# Patient Record
Sex: MALE | Race: ASIAN | NOT HISPANIC OR LATINO | URBAN - METROPOLITAN AREA
[De-identification: names, ages, dates, MRNs, and addresses within clinical notes are randomized per-mention and may not be internally consistent; named-entity substitution may affect disease eponyms.]

---

## 2018-08-16 ENCOUNTER — INPATIENT HOSPITAL (OUTPATIENT)
Dept: URBAN - METROPOLITAN AREA HOSPITAL 107 | Facility: HOSPITAL | Age: 43
End: 2018-08-16
Payer: COMMERCIAL

## 2018-08-16 DIAGNOSIS — R11.2 NAUSEA WITH VOMITING, UNSPECIFIED: ICD-10-CM

## 2018-08-16 DIAGNOSIS — K70.9 ALCOHOLIC LIVER DISEASE, UNSPECIFIED: ICD-10-CM

## 2018-08-16 DIAGNOSIS — F10.10 ALCOHOL ABUSE, UNCOMPLICATED: ICD-10-CM

## 2018-08-16 DIAGNOSIS — R94.5 ABNORMAL RESULTS OF LIVER FUNCTION STUDIES: ICD-10-CM

## 2018-08-16 DIAGNOSIS — R44.3 HALLUCINATIONS, UNSPECIFIED: ICD-10-CM

## 2018-08-16 DIAGNOSIS — R93.3 ABNORMAL FINDINGS ON DIAGNOSTIC IMAGING OF OTHER PARTS OF DI: ICD-10-CM

## 2018-08-16 PROCEDURE — 99223 1ST HOSP IP/OBS HIGH 75: CPT

## 2018-08-28 ENCOUNTER — OFFICE (OUTPATIENT)
Dept: URBAN - METROPOLITAN AREA CLINIC 75 | Facility: CLINIC | Age: 43
End: 2018-08-28
Payer: COMMERCIAL

## 2018-08-28 VITALS
DIASTOLIC BLOOD PRESSURE: 64 MMHG | SYSTOLIC BLOOD PRESSURE: 112 MMHG | HEIGHT: 66 IN | WEIGHT: 154 LBS | HEART RATE: 62 BPM

## 2018-08-28 DIAGNOSIS — R94.5 ABNORMAL RESULTS OF LIVER FUNCTION STUDIES: ICD-10-CM

## 2018-08-28 DIAGNOSIS — F10.10 ALCOHOL ABUSE, UNCOMPLICATED: ICD-10-CM

## 2018-08-28 DIAGNOSIS — R93.2 ABNORMAL FINDINGS ON DIAGNOSTIC IMAGING OF LIVER AND BILIARY: ICD-10-CM

## 2018-08-28 PROCEDURE — 99203 OFFICE O/P NEW LOW 30 MIN: CPT | Performed by: INTERNAL MEDICINE

## 2018-10-19 ENCOUNTER — INPATIENT HOSPITAL (OUTPATIENT)
Dept: URBAN - METROPOLITAN AREA HOSPITAL 107 | Facility: HOSPITAL | Age: 43
End: 2018-10-19
Payer: COMMERCIAL

## 2018-10-19 DIAGNOSIS — F10.10 ALCOHOL ABUSE, UNCOMPLICATED: ICD-10-CM

## 2018-10-19 DIAGNOSIS — R10.9 UNSPECIFIED ABDOMINAL PAIN: ICD-10-CM

## 2018-10-19 DIAGNOSIS — R17 UNSPECIFIED JAUNDICE: ICD-10-CM

## 2018-10-19 DIAGNOSIS — K70.9 ALCOHOLIC LIVER DISEASE, UNSPECIFIED: ICD-10-CM

## 2018-10-19 DIAGNOSIS — R11.2 NAUSEA WITH VOMITING, UNSPECIFIED: ICD-10-CM

## 2018-10-19 DIAGNOSIS — R18.8 OTHER ASCITES: ICD-10-CM

## 2018-10-19 PROCEDURE — 99223 1ST HOSP IP/OBS HIGH 75: CPT | Performed by: INTERNAL MEDICINE

## 2018-10-20 ENCOUNTER — INPATIENT HOSPITAL (OUTPATIENT)
Dept: URBAN - METROPOLITAN AREA HOSPITAL 107 | Facility: HOSPITAL | Age: 43
End: 2018-10-20
Payer: COMMERCIAL

## 2018-10-20 DIAGNOSIS — K70.9 ALCOHOLIC LIVER DISEASE, UNSPECIFIED: ICD-10-CM

## 2018-10-20 DIAGNOSIS — R11.2 NAUSEA WITH VOMITING, UNSPECIFIED: ICD-10-CM

## 2018-10-20 DIAGNOSIS — R17 UNSPECIFIED JAUNDICE: ICD-10-CM

## 2018-10-20 DIAGNOSIS — R18.8 OTHER ASCITES: ICD-10-CM

## 2018-10-20 DIAGNOSIS — F10.10 ALCOHOL ABUSE, UNCOMPLICATED: ICD-10-CM

## 2018-10-20 DIAGNOSIS — R10.9 UNSPECIFIED ABDOMINAL PAIN: ICD-10-CM

## 2018-10-20 PROCEDURE — 99232 SBSQ HOSP IP/OBS MODERATE 35: CPT | Performed by: INTERNAL MEDICINE

## 2018-10-21 PROCEDURE — 99232 SBSQ HOSP IP/OBS MODERATE 35: CPT | Performed by: INTERNAL MEDICINE

## 2018-10-22 ENCOUNTER — INPATIENT HOSPITAL (OUTPATIENT)
Dept: URBAN - METROPOLITAN AREA HOSPITAL 107 | Facility: HOSPITAL | Age: 43
End: 2018-10-22
Payer: COMMERCIAL

## 2018-10-22 DIAGNOSIS — K70.9 ALCOHOLIC LIVER DISEASE, UNSPECIFIED: ICD-10-CM

## 2018-10-22 DIAGNOSIS — F10.10 ALCOHOL ABUSE, UNCOMPLICATED: ICD-10-CM

## 2018-10-22 DIAGNOSIS — R11.2 NAUSEA WITH VOMITING, UNSPECIFIED: ICD-10-CM

## 2018-10-22 DIAGNOSIS — R18.8 OTHER ASCITES: ICD-10-CM

## 2018-10-22 DIAGNOSIS — R17 UNSPECIFIED JAUNDICE: ICD-10-CM

## 2018-10-22 DIAGNOSIS — R10.12 LEFT UPPER QUADRANT PAIN: ICD-10-CM

## 2018-10-22 DIAGNOSIS — R10.11 RIGHT UPPER QUADRANT PAIN: ICD-10-CM

## 2018-10-22 PROCEDURE — 99232 SBSQ HOSP IP/OBS MODERATE 35: CPT | Performed by: PHYSICIAN ASSISTANT

## 2018-10-23 PROCEDURE — 99231 SBSQ HOSP IP/OBS SF/LOW 25: CPT | Performed by: PHYSICIAN ASSISTANT

## 2018-10-24 ENCOUNTER — INPATIENT HOSPITAL (OUTPATIENT)
Dept: URBAN - METROPOLITAN AREA HOSPITAL 107 | Facility: HOSPITAL | Age: 43
End: 2018-10-24
Payer: COMMERCIAL

## 2018-10-24 DIAGNOSIS — R10.9 UNSPECIFIED ABDOMINAL PAIN: ICD-10-CM

## 2018-10-24 DIAGNOSIS — K25.9 GASTRIC ULCER, UNSPECIFIED AS ACUTE OR CHRONIC, WITHOUT HEMO: ICD-10-CM

## 2018-10-24 DIAGNOSIS — K29.70 GASTRITIS, UNSPECIFIED, WITHOUT BLEEDING: ICD-10-CM

## 2018-10-24 PROCEDURE — 43239 EGD BIOPSY SINGLE/MULTIPLE: CPT | Performed by: INTERNAL MEDICINE

## 2018-10-30 ENCOUNTER — OFFICE (OUTPATIENT)
Dept: URBAN - METROPOLITAN AREA CLINIC 75 | Facility: CLINIC | Age: 43
End: 2018-10-30
Payer: COMMERCIAL

## 2018-10-30 VITALS
DIASTOLIC BLOOD PRESSURE: 76 MMHG | HEIGHT: 66 IN | SYSTOLIC BLOOD PRESSURE: 124 MMHG | WEIGHT: 146 LBS | HEART RATE: 92 BPM

## 2018-10-30 DIAGNOSIS — F10.10 ALCOHOL ABUSE, UNCOMPLICATED: ICD-10-CM

## 2018-10-30 DIAGNOSIS — K70.9 ALCOHOLIC LIVER DISEASE, UNSPECIFIED: ICD-10-CM

## 2018-10-30 DIAGNOSIS — K29.70 GASTRITIS, UNSPECIFIED, WITHOUT BLEEDING: ICD-10-CM

## 2018-10-30 PROCEDURE — 99213 OFFICE O/P EST LOW 20 MIN: CPT | Performed by: INTERNAL MEDICINE

## 2018-12-18 ENCOUNTER — INPATIENT HOSPITAL (OUTPATIENT)
Dept: URBAN - METROPOLITAN AREA HOSPITAL 107 | Facility: HOSPITAL | Age: 43
End: 2018-12-18
Payer: COMMERCIAL

## 2018-12-18 DIAGNOSIS — F10.239 ALCOHOL DEPENDENCE WITH WITHDRAWAL, UNSPECIFIED: ICD-10-CM

## 2018-12-18 DIAGNOSIS — K70.30 ALCOHOLIC CIRRHOSIS OF LIVER WITHOUT ASCITES: ICD-10-CM

## 2018-12-18 DIAGNOSIS — F10.10 ALCOHOL ABUSE, UNCOMPLICATED: ICD-10-CM

## 2018-12-18 PROCEDURE — 99222 1ST HOSP IP/OBS MODERATE 55: CPT | Performed by: INTERNAL MEDICINE

## 2018-12-31 ENCOUNTER — APPOINTMENT (OUTPATIENT)
Dept: GENERAL RADIOLOGY | Facility: HOSPITAL | Age: 43
End: 2018-12-31

## 2018-12-31 ENCOUNTER — HOSPITAL ENCOUNTER (EMERGENCY)
Facility: HOSPITAL | Age: 43
Discharge: HOME | End: 2018-12-31
Attending: EMERGENCY MEDICINE

## 2018-12-31 ENCOUNTER — APPOINTMENT (OUTPATIENT)
Dept: CT IMAGING | Facility: HOSPITAL | Age: 43
End: 2018-12-31

## 2018-12-31 VITALS
WEIGHT: 148.7 LBS | TEMPERATURE: 98.3 F | OXYGEN SATURATION: 98 % | HEIGHT: 66 IN | RESPIRATION RATE: 18 BRPM | DIASTOLIC BLOOD PRESSURE: 73 MMHG | HEART RATE: 82 BPM | BODY MASS INDEX: 23.9 KG/M2 | SYSTOLIC BLOOD PRESSURE: 110 MMHG

## 2018-12-31 DIAGNOSIS — D53.9 MACROCYTIC ANEMIA: ICD-10-CM

## 2018-12-31 DIAGNOSIS — R17 JAUNDICE: Primary | ICD-10-CM

## 2018-12-31 DIAGNOSIS — K70.31 ALCOHOLIC CIRRHOSIS OF LIVER WITH ASCITES (HCC): ICD-10-CM

## 2018-12-31 DIAGNOSIS — E87.1 HYPONATREMIA: ICD-10-CM

## 2018-12-31 DIAGNOSIS — F10.20 ALCOHOLISM (HCC): ICD-10-CM

## 2018-12-31 LAB
ALBUMIN SERPL-MCNC: 2.4 G/DL (ref 3.5–5.2)
ALBUMIN/GLOB SERPL: 0.5 G/DL
ALP SERPL-CCNC: 108 U/L (ref 39–117)
ALT SERPL W P-5'-P-CCNC: 26 U/L (ref 1–41)
AMMONIA BLD-SCNC: 39 UMOL/L (ref 16–60)
AMPHET+METHAMPHET UR QL: NEGATIVE
ANION GAP SERPL CALCULATED.3IONS-SCNC: 7.1 MMOL/L
AST SERPL-CCNC: 76 U/L (ref 1–40)
BARBITURATES UR QL SCN: NEGATIVE
BASOPHILS # BLD AUTO: 0.1 10*3/MM3 (ref 0–0.2)
BASOPHILS NFR BLD AUTO: 2 % (ref 0–1.5)
BENZODIAZ UR QL SCN: POSITIVE
BILIRUB CONJ SERPL-MCNC: 3.2 MG/DL (ref 0–0.3)
BILIRUB SERPL-MCNC: 5 MG/DL (ref 0.1–1.2)
BILIRUB UR QL STRIP: ABNORMAL
BUN BLD-MCNC: 6 MG/DL (ref 6–20)
BUN/CREAT SERPL: 5.6 (ref 7–25)
CALCIUM SPEC-SCNC: 8.3 MG/DL (ref 8.6–10.5)
CANNABINOIDS SERPL QL: POSITIVE
CHLORIDE SERPL-SCNC: 102 MMOL/L (ref 98–107)
CK SERPL-CCNC: 65 U/L (ref 20–200)
CLARITY UR: CLEAR
CO2 SERPL-SCNC: 23.9 MMOL/L (ref 22–29)
COCAINE UR QL: NEGATIVE
COLOR UR: ABNORMAL
CREAT BLD-MCNC: 1.08 MG/DL (ref 0.76–1.27)
DEPRECATED RDW RBC AUTO: 74.9 FL (ref 37–54)
EOSINOPHIL # BLD AUTO: 0.15 10*3/MM3 (ref 0–0.7)
EOSINOPHIL NFR BLD AUTO: 3 % (ref 0.3–6.2)
ERYTHROCYTE [DISTWIDTH] IN BLOOD BY AUTOMATED COUNT: 19.5 % (ref 11.5–14.5)
ETHANOL BLD-MCNC: <10 MG/DL (ref 0–10)
ETHANOL UR QL: <0.01 %
GFR SERPL CREATININE-BSD FRML MDRD: 74 ML/MIN/1.73
GFR SERPL CREATININE-BSD FRML MDRD: 90 ML/MIN/1.73
GLOBULIN UR ELPH-MCNC: 5 GM/DL
GLUCOSE BLD-MCNC: 104 MG/DL (ref 65–99)
GLUCOSE UR STRIP-MCNC: NEGATIVE MG/DL
HAV IGM SERPL QL IA: NORMAL
HBV CORE IGM SERPL QL IA: NORMAL
HBV SURFACE AG SERPL QL IA: NORMAL
HCT VFR BLD AUTO: 28 % (ref 40.4–52.2)
HCV AB SER DONR QL: NORMAL
HGB BLD-MCNC: 9 G/DL (ref 13.7–17.6)
HGB UR QL STRIP.AUTO: NEGATIVE
HOLD SPECIMEN: NORMAL
HOLD SPECIMEN: NORMAL
IMM GRANULOCYTES # BLD AUTO: 0 10*3/MM3 (ref 0–0.03)
IMM GRANULOCYTES NFR BLD AUTO: 0 % (ref 0–0.5)
INR PPP: 2.09 (ref 0.9–1.1)
KETONES UR QL STRIP: ABNORMAL
LEUKOCYTE ESTERASE UR QL STRIP.AUTO: NEGATIVE
LIPASE SERPL-CCNC: 71 U/L (ref 13–60)
LYMPHOCYTES # BLD AUTO: 1.13 10*3/MM3 (ref 0.9–4.8)
LYMPHOCYTES NFR BLD AUTO: 22.5 % (ref 19.6–45.3)
MAGNESIUM SERPL-MCNC: 1.7 MG/DL (ref 1.6–2.6)
MCH RBC QN AUTO: 33.7 PG (ref 27–32.7)
MCHC RBC AUTO-ENTMCNC: 32.1 G/DL (ref 32.6–36.4)
MCV RBC AUTO: 104.9 FL (ref 79.8–96.2)
METHADONE UR QL SCN: NEGATIVE
MONOCYTES # BLD AUTO: 0.39 10*3/MM3 (ref 0.2–1.2)
MONOCYTES NFR BLD AUTO: 7.8 % (ref 5–12)
NEUTROPHILS # BLD AUTO: 3.25 10*3/MM3 (ref 1.9–8.1)
NEUTROPHILS NFR BLD AUTO: 64.7 % (ref 42.7–76)
NITRITE UR QL STRIP: NEGATIVE
NRBC BLD AUTO-RTO: 0 /100 WBC (ref 0–0)
OPIATES UR QL: NEGATIVE
OXYCODONE UR QL SCN: NEGATIVE
PH UR STRIP.AUTO: 6.5 [PH] (ref 5–8)
PHOSPHATE SERPL-MCNC: 3.1 MG/DL (ref 2.5–4.5)
PLATELET # BLD AUTO: 73 10*3/MM3 (ref 140–500)
PMV BLD AUTO: 10.2 FL (ref 6–12)
POTASSIUM BLD-SCNC: 3.8 MMOL/L (ref 3.5–5.2)
PROT SERPL-MCNC: 7.4 G/DL (ref 6–8.5)
PROT UR QL STRIP: NEGATIVE
PROTHROMBIN TIME: 23.1 SECONDS (ref 11.7–14.2)
RBC # BLD AUTO: 2.67 10*6/MM3 (ref 4.6–6)
SODIUM BLD-SCNC: 133 MMOL/L (ref 136–145)
SP GR UR STRIP: 1.02 (ref 1–1.03)
T4 FREE SERPL-MCNC: 1.18 NG/DL (ref 0.93–1.7)
TSH SERPL DL<=0.05 MIU/L-ACNC: 1.71 MIU/ML (ref 0.27–4.2)
UROBILINOGEN UR QL STRIP: ABNORMAL
WBC NRBC COR # BLD: 5.02 10*3/MM3 (ref 4.5–10.7)
WHOLE BLOOD HOLD SPECIMEN: NORMAL
WHOLE BLOOD HOLD SPECIMEN: NORMAL

## 2018-12-31 PROCEDURE — 80307 DRUG TEST PRSMV CHEM ANLYZR: CPT | Performed by: EMERGENCY MEDICINE

## 2018-12-31 PROCEDURE — 82248 BILIRUBIN DIRECT: CPT | Performed by: EMERGENCY MEDICINE

## 2018-12-31 PROCEDURE — 71046 X-RAY EXAM CHEST 2 VIEWS: CPT

## 2018-12-31 PROCEDURE — 99284 EMERGENCY DEPT VISIT MOD MDM: CPT

## 2018-12-31 PROCEDURE — 80074 ACUTE HEPATITIS PANEL: CPT | Performed by: EMERGENCY MEDICINE

## 2018-12-31 PROCEDURE — 82550 ASSAY OF CK (CPK): CPT | Performed by: EMERGENCY MEDICINE

## 2018-12-31 PROCEDURE — 85025 COMPLETE CBC W/AUTO DIFF WBC: CPT | Performed by: EMERGENCY MEDICINE

## 2018-12-31 PROCEDURE — 96365 THER/PROPH/DIAG IV INF INIT: CPT

## 2018-12-31 PROCEDURE — 25010000002 THIAMINE PER 100 MG: Performed by: EMERGENCY MEDICINE

## 2018-12-31 PROCEDURE — 82140 ASSAY OF AMMONIA: CPT | Performed by: EMERGENCY MEDICINE

## 2018-12-31 PROCEDURE — 93005 ELECTROCARDIOGRAM TRACING: CPT | Performed by: EMERGENCY MEDICINE

## 2018-12-31 PROCEDURE — 96361 HYDRATE IV INFUSION ADD-ON: CPT

## 2018-12-31 PROCEDURE — 84100 ASSAY OF PHOSPHORUS: CPT | Performed by: EMERGENCY MEDICINE

## 2018-12-31 PROCEDURE — 83690 ASSAY OF LIPASE: CPT | Performed by: EMERGENCY MEDICINE

## 2018-12-31 PROCEDURE — 85610 PROTHROMBIN TIME: CPT | Performed by: EMERGENCY MEDICINE

## 2018-12-31 PROCEDURE — 25010000002 IOPAMIDOL 61 % SOLUTION: Performed by: EMERGENCY MEDICINE

## 2018-12-31 PROCEDURE — 80053 COMPREHEN METABOLIC PANEL: CPT | Performed by: EMERGENCY MEDICINE

## 2018-12-31 PROCEDURE — 93010 ELECTROCARDIOGRAM REPORT: CPT | Performed by: INTERNAL MEDICINE

## 2018-12-31 PROCEDURE — 74177 CT ABD & PELVIS W/CONTRAST: CPT

## 2018-12-31 PROCEDURE — 81003 URINALYSIS AUTO W/O SCOPE: CPT | Performed by: EMERGENCY MEDICINE

## 2018-12-31 PROCEDURE — 83735 ASSAY OF MAGNESIUM: CPT | Performed by: EMERGENCY MEDICINE

## 2018-12-31 PROCEDURE — 84443 ASSAY THYROID STIM HORMONE: CPT | Performed by: EMERGENCY MEDICINE

## 2018-12-31 PROCEDURE — 84439 ASSAY OF FREE THYROXINE: CPT | Performed by: EMERGENCY MEDICINE

## 2018-12-31 RX ORDER — ATENOLOL 25 MG/1
25 TABLET ORAL DAILY
Status: ON HOLD | COMMUNITY
End: 2020-01-02

## 2018-12-31 RX ORDER — THIAMINE MONONITRATE (VIT B1) 100 MG
100 TABLET ORAL DAILY
COMMUNITY

## 2018-12-31 RX ORDER — FUROSEMIDE 40 MG/1
40 TABLET ORAL 2 TIMES DAILY
COMMUNITY

## 2018-12-31 RX ORDER — FOLIC ACID 1 MG/1
1 TABLET ORAL DAILY
COMMUNITY

## 2018-12-31 RX ORDER — ONDANSETRON 4 MG/1
4 TABLET, FILM COATED ORAL EVERY 6 HOURS PRN
Status: ON HOLD | COMMUNITY
End: 2020-01-02

## 2018-12-31 RX ORDER — TEMAZEPAM 15 MG/1
15 CAPSULE ORAL NIGHTLY PRN
Status: ON HOLD | COMMUNITY
End: 2020-01-02

## 2018-12-31 RX ORDER — CHLORDIAZEPOXIDE HYDROCHLORIDE 5 MG/1
5 CAPSULE, GELATIN COATED ORAL 3 TIMES DAILY
Status: ON HOLD | COMMUNITY
End: 2020-01-02

## 2018-12-31 RX ORDER — SPIRONOLACTONE 50 MG/1
50 TABLET, FILM COATED ORAL DAILY
COMMUNITY

## 2018-12-31 RX ORDER — SODIUM CHLORIDE 0.9 % (FLUSH) 0.9 %
10 SYRINGE (ML) INJECTION AS NEEDED
Status: DISCONTINUED | OUTPATIENT
Start: 2018-12-31 | End: 2018-12-31 | Stop reason: HOSPADM

## 2018-12-31 RX ORDER — PANTOPRAZOLE SODIUM 40 MG/1
40 TABLET, DELAYED RELEASE ORAL DAILY
COMMUNITY

## 2018-12-31 RX ADMIN — SODIUM CHLORIDE, POTASSIUM CHLORIDE, SODIUM LACTATE AND CALCIUM CHLORIDE 1000 ML: 600; 310; 30; 20 INJECTION, SOLUTION INTRAVENOUS at 18:21

## 2018-12-31 RX ADMIN — THIAMINE HYDROCHLORIDE 100 MG: 100 INJECTION, SOLUTION INTRAMUSCULAR; INTRAVENOUS at 19:24

## 2018-12-31 RX ADMIN — IOPAMIDOL 85 ML: 612 INJECTION, SOLUTION INTRAVENOUS at 19:43

## 2019-01-01 NOTE — DISCHARGE INSTRUCTIONS
Alcohol Detox Assistance     [facility] [place]   [address] [place] [address] [facility]   Toll Free: [phone] Phone: [phone]      [facility] [place]   [address] [place] [address]      [facility] [address] [address] [facility]   Phone: [phone]    [facility]  [address] [place], [place] [address] [phone]    [facility]  [address] [place] [address]  Main Phone: [phone]  Assessment and Referral Center: [phone]    The [facility] [place]  is located at  [address] [place], [place] [address] [phone]    The [facility] [place]  is located at  [address] [id number] [phone]     The [address] [id number] [phone] (p)  [phone] (f: Exec. office)  [phone] (f: Front office)     [facility]  [address] [place], [place]  [phone]    [facility]  [phone]    National Suicide Prevention Lifeline  Call [phone]  Available 24 hours everyday

## 2019-02-05 ENCOUNTER — OFFICE (OUTPATIENT)
Dept: URBAN - METROPOLITAN AREA CLINIC 75 | Facility: CLINIC | Age: 44
End: 2019-02-05
Payer: COMMERCIAL

## 2019-02-05 VITALS
OXYGEN SATURATION: 97 % | WEIGHT: 157 LBS | RESPIRATION RATE: 16 BRPM | HEIGHT: 66 IN | SYSTOLIC BLOOD PRESSURE: 124 MMHG | DIASTOLIC BLOOD PRESSURE: 82 MMHG | HEART RATE: 90 BPM

## 2019-02-05 DIAGNOSIS — F10.10 ALCOHOL ABUSE, UNCOMPLICATED: ICD-10-CM

## 2019-02-05 DIAGNOSIS — K70.31 ALCOHOLIC CIRRHOSIS OF LIVER WITH ASCITES: ICD-10-CM

## 2019-02-05 PROCEDURE — 99213 OFFICE O/P EST LOW 20 MIN: CPT | Performed by: INTERNAL MEDICINE

## 2019-02-08 ENCOUNTER — TRANSCRIBE ORDERS (OUTPATIENT)
Dept: ADMINISTRATIVE | Facility: HOSPITAL | Age: 44
End: 2019-02-08

## 2019-02-08 DIAGNOSIS — K70.31 ALCOHOLIC CIRRHOSIS OF LIVER WITH ASCITES (HCC): Primary | ICD-10-CM

## 2019-02-11 PROBLEM — K70.0 ALCOHOL INDUCED FATTY LIVER: Status: ACTIVE | Noted: 2019-02-11

## 2019-02-11 RX ORDER — ALBUMIN (HUMAN) 12.5 G/50ML
50 SOLUTION INTRAVENOUS ONCE
Start: 2019-02-14

## 2019-02-14 ENCOUNTER — HOSPITAL ENCOUNTER (OUTPATIENT)
Dept: ULTRASOUND IMAGING | Facility: HOSPITAL | Age: 44
Discharge: HOME | End: 2019-02-14
Admitting: RADIOLOGY

## 2019-02-14 VITALS
BODY MASS INDEX: 24.21 KG/M2 | DIASTOLIC BLOOD PRESSURE: 75 MMHG | RESPIRATION RATE: 20 BRPM | WEIGHT: 150 LBS | OXYGEN SATURATION: 100 % | TEMPERATURE: 98.3 F | HEART RATE: 78 BPM | SYSTOLIC BLOOD PRESSURE: 119 MMHG

## 2019-02-14 DIAGNOSIS — K70.31 ALCOHOLIC CIRRHOSIS OF LIVER WITH ASCITES (HCC): ICD-10-CM

## 2019-02-14 LAB
INR PPP: 1.3 (ref 0.8–1.2)
PROTHROMBIN TIME: 15.2 SECONDS (ref 12.8–15.2)

## 2019-02-14 PROCEDURE — 85610 PROTHROMBIN TIME: CPT

## 2019-02-14 PROCEDURE — 76942 ECHO GUIDE FOR BIOPSY: CPT

## 2019-02-14 NOTE — DISCHARGE INSTRUCTIONS
..EDUCATION /DISCHARGE INSTRUCTIONS  Paracentesis:  A needle is inserted into the space between your abdominal organs and the membrane that surrounds them (peritoneal space).  It is done for the diagnosis and treatment of fluid that is resistant to other therapies.  It helps determine the cause of the fluid and at the relieves pressure created by the fluid.  A sample is obtained and sent to the laboratory for study.    During the procedure:  You will lie on a bed on your back with your legs drawn up.  Your abdomen will be exposed from the chest to the pelvis.  You will otherwise be covered to maintain comfort.  A physician will clean your abdomen with antiseptic soap, place a sterile towel around the site and administer a local anesthetic to numb the area.  The physician will insert a needle into your abdominal wall.  There may be a popping sound which signifies the needle has pierced the abdominal wall. Next, the physician will attach tubing to transfer a sample into a collection bottle.  After the fluid is obtained the needle will be removed.  A pressure dressing is applied to the site.    Risks of the procedure include but are not limited to:   *  Bleeding    *  Wound infection   *  Low blood pressure   *  Decreased urination   *  Low sodium if a large amount of fluid is removed   *  Puncture of abdominal organs by the needle    Benefits of the procedure:  Benefits include the removal of fluid from the abdomen, relief of abdominal pressure and facilitation of a diagnosis.    Alternatives to the procedure:  Possible alternatives are diuretic drug therapy or surgery to place a shunt to drain fluid.  Risks of diuretic drug therapy include possible dehydration and renal failure.  The benefit of drug therapy is that it can be done at home under physician supervision.  Risks of shunt placement include exposure to anesthesia, infection, excessive bleeding and injury to abdominal organs.  The benefit of a shunt is that  it can be used to drain fluid over a longer period of time.  THIS EDUCATION INFORMATION WAS REVIEWED PRIOR TO THE PROCEDURE AND CONSENT. Patient initials__________________Time_________________    Post procedure:    *  Weigh yourself daily.   *  Follow your doctors dietary instructions.   *  Rest today (no pushing pulling, straining or heavy lifting).   *  Slowly increase activity tomorow.   *  If you received sedation do not drive for 24 hours.              * Skin affix applied to puncture site. Do not try to remove, scratch or apply lotion   * Skin affix will fall off on it's own   *  You may shower tomorrow    Call your doctor if experiencing:   *  Signs of infection such as redness, swelling, excessive pain and / or foul       smelling drainage from the puncture site.   *  Chills or fever over 101 degrees (by mouth).   *  Fainting.   *  Rapid weight gain / loss.   *  Unrelieved pain.   *  Any new or severe symptoms.    Following the procedure:      Follow-up with the ordering physician as directed.   Continue to take other medications as directed by your physician unless    otherwise instructed.   If applicable, resume taking your blood thinners or Aspirin in 24 hours.    If you have any concerns please call the Radiology Nurses Desk at [phone].  You are the most important factor in your recovery.  Follow the above instructions carefully.

## 2019-02-14 NOTE — PRE-PROCEDURE NOTE
Name: Aramis Baez ADMIT: 2019   : 1975  PCP: Delilah Alicia APRN    MRN: [id number] LOS: 0 days   AGE/SEX: 43 y.o. male  ROOM: Room/bed info not found       Chief complaint   Patient is a 43 y.o. male presents with abdominal distention..     Past Surgical History:  History reviewed. No pertinent surgical history.    Past Medical History:  Past Medical History:   Diagnosis Date   • Cirrhosis of liver with ascites (CMS/HCC)    • Concussion    • Hypertension        Home Medications:    (Not in a hospital admission)    Allergies:  Mushroom; Citrus; Gluten meal; and Lactose intolerance (gi)    Family History:  No family history on file.    Social History:  Social History     Tobacco Use   • Smoking status: Never Smoker   • Smokeless tobacco: Never Used   Substance Use Topics   • Alcohol use: No     Frequency: Never   • Drug use: No        Objective     Physical Exam:   OK for Paracentesis    Vital Signs  Temp:  [98.3 °F (36.8 °C)] 98.3 °F (36.8 °C)  Heart Rate:  [78] 78  Resp:  [20] 20  BP: (119)/(75) 119/75    Anticipated Surgical Procedure:  U/S Guided Paracentesis    The risks, benefits and alternatives of this procedure have been discussed with the patient or responsible party: Yes        Bijan Rasmussen MD  19  12:04 PM

## 2019-04-26 ENCOUNTER — OFFICE (OUTPATIENT)
Dept: URBAN - METROPOLITAN AREA CLINIC 75 | Facility: CLINIC | Age: 44
End: 2019-04-26
Payer: COMMERCIAL

## 2019-04-26 VITALS
DIASTOLIC BLOOD PRESSURE: 72 MMHG | WEIGHT: 140 LBS | HEIGHT: 66 IN | SYSTOLIC BLOOD PRESSURE: 118 MMHG | HEART RATE: 65 BPM

## 2019-04-26 DIAGNOSIS — K70.31 ALCOHOLIC CIRRHOSIS OF LIVER WITH ASCITES: ICD-10-CM

## 2019-04-26 PROCEDURE — 99213 OFFICE O/P EST LOW 20 MIN: CPT | Performed by: INTERNAL MEDICINE

## 2019-04-26 RX ORDER — FUROSEMIDE 40 MG/1
60 TABLET ORAL
Qty: 45 | Refills: 3 | Status: ACTIVE

## 2019-04-26 RX ORDER — SPIRONOLACTONE 100 MG/1
150 TABLET, FILM COATED ORAL
Qty: 45 | Refills: 3 | Status: ACTIVE

## 2019-06-28 ENCOUNTER — OFFICE (OUTPATIENT)
Dept: URBAN - METROPOLITAN AREA CLINIC 75 | Facility: CLINIC | Age: 44
End: 2019-06-28
Payer: COMMERCIAL

## 2019-06-28 VITALS
SYSTOLIC BLOOD PRESSURE: 110 MMHG | HEIGHT: 66 IN | HEART RATE: 74 BPM | DIASTOLIC BLOOD PRESSURE: 78 MMHG | WEIGHT: 134 LBS

## 2019-06-28 DIAGNOSIS — R32 UNSPECIFIED URINARY INCONTINENCE: ICD-10-CM

## 2019-06-28 DIAGNOSIS — K70.31 ALCOHOLIC CIRRHOSIS OF LIVER WITH ASCITES: ICD-10-CM

## 2019-06-28 DIAGNOSIS — R25.2 CRAMP AND SPASM: ICD-10-CM

## 2019-06-28 PROCEDURE — 99213 OFFICE O/P EST LOW 20 MIN: CPT | Performed by: INTERNAL MEDICINE

## 2019-06-28 RX ORDER — ZINC GLUCONATE 50 MG
50 TABLET ORAL
Qty: 90 | Refills: 3 | Status: COMPLETED
Start: 2019-06-28 | End: 2019-10-29

## 2019-10-11 ENCOUNTER — INPATIENT HOSPITAL (OUTPATIENT)
Dept: URBAN - METROPOLITAN AREA HOSPITAL 107 | Facility: HOSPITAL | Age: 44
End: 2019-10-11
Payer: COMMERCIAL

## 2019-10-11 DIAGNOSIS — R94.5 ABNORMAL RESULTS OF LIVER FUNCTION STUDIES: ICD-10-CM

## 2019-10-11 DIAGNOSIS — D64.89 OTHER SPECIFIED ANEMIAS: ICD-10-CM

## 2019-10-11 DIAGNOSIS — F10.10 ALCOHOL ABUSE, UNCOMPLICATED: ICD-10-CM

## 2019-10-11 DIAGNOSIS — K70.31 ALCOHOLIC CIRRHOSIS OF LIVER WITH ASCITES: ICD-10-CM

## 2019-10-11 DIAGNOSIS — E16.2 HYPOGLYCEMIA, UNSPECIFIED: ICD-10-CM

## 2019-10-11 PROCEDURE — 99232 SBSQ HOSP IP/OBS MODERATE 35: CPT | Performed by: INTERNAL MEDICINE

## 2019-10-29 ENCOUNTER — OFFICE (OUTPATIENT)
Dept: URBAN - METROPOLITAN AREA CLINIC 75 | Facility: CLINIC | Age: 44
End: 2019-10-29
Payer: COMMERCIAL

## 2019-10-29 VITALS
SYSTOLIC BLOOD PRESSURE: 120 MMHG | WEIGHT: 152 LBS | DIASTOLIC BLOOD PRESSURE: 80 MMHG | HEART RATE: 85 BPM | HEIGHT: 66 IN

## 2019-10-29 DIAGNOSIS — F10.10 ALCOHOL ABUSE, UNCOMPLICATED: ICD-10-CM

## 2019-10-29 DIAGNOSIS — I10 ESSENTIAL (PRIMARY) HYPERTENSION: ICD-10-CM

## 2019-10-29 DIAGNOSIS — K70.31 ALCOHOLIC CIRRHOSIS OF LIVER WITH ASCITES: ICD-10-CM

## 2019-10-29 PROCEDURE — 99214 OFFICE O/P EST MOD 30 MIN: CPT | Performed by: INTERNAL MEDICINE

## 2019-12-08 ENCOUNTER — INPATIENT HOSPITAL (OUTPATIENT)
Dept: URBAN - METROPOLITAN AREA HOSPITAL 107 | Facility: HOSPITAL | Age: 44
End: 2019-12-08
Payer: COMMERCIAL

## 2019-12-08 DIAGNOSIS — E16.2 HYPOGLYCEMIA, UNSPECIFIED: ICD-10-CM

## 2019-12-08 DIAGNOSIS — R94.5 ABNORMAL RESULTS OF LIVER FUNCTION STUDIES: ICD-10-CM

## 2019-12-08 DIAGNOSIS — F10.10 ALCOHOL ABUSE, UNCOMPLICATED: ICD-10-CM

## 2019-12-08 DIAGNOSIS — K70.31 ALCOHOLIC CIRRHOSIS OF LIVER WITH ASCITES: ICD-10-CM

## 2019-12-08 DIAGNOSIS — D64.89 OTHER SPECIFIED ANEMIAS: ICD-10-CM

## 2019-12-08 PROCEDURE — 99223 1ST HOSP IP/OBS HIGH 75: CPT | Performed by: INTERNAL MEDICINE

## 2019-12-25 ENCOUNTER — ON CAMPUS - OUTPATIENT (OUTPATIENT)
Dept: URBAN - METROPOLITAN AREA HOSPITAL 108 | Facility: HOSPITAL | Age: 44
End: 2019-12-25
Payer: COMMERCIAL

## 2019-12-25 DIAGNOSIS — D64.89 OTHER SPECIFIED ANEMIAS: ICD-10-CM

## 2019-12-25 DIAGNOSIS — E16.2 HYPOGLYCEMIA, UNSPECIFIED: ICD-10-CM

## 2019-12-25 DIAGNOSIS — F10.10 ALCOHOL ABUSE, UNCOMPLICATED: ICD-10-CM

## 2019-12-25 DIAGNOSIS — R94.5 ABNORMAL RESULTS OF LIVER FUNCTION STUDIES: ICD-10-CM

## 2019-12-25 DIAGNOSIS — K70.31 ALCOHOLIC CIRRHOSIS OF LIVER WITH ASCITES: ICD-10-CM

## 2019-12-25 PROCEDURE — 99213 OFFICE O/P EST LOW 20 MIN: CPT | Performed by: INTERNAL MEDICINE

## 2019-12-25 PROCEDURE — 99223 1ST HOSP IP/OBS HIGH 75: CPT | Performed by: INTERNAL MEDICINE

## 2020-01-02 ENCOUNTER — HOSPITAL ENCOUNTER (INPATIENT)
Facility: HOSPITAL | Age: 45
LOS: 3 days | Discharge: HOME | End: 2020-01-05
Attending: EMERGENCY MEDICINE | Admitting: INTERNAL MEDICINE

## 2020-01-02 ENCOUNTER — APPOINTMENT (OUTPATIENT)
Dept: ULTRASOUND IMAGING | Facility: HOSPITAL | Age: 45
End: 2020-01-02

## 2020-01-02 DIAGNOSIS — E88.09 HYPOALBUMINEMIA: ICD-10-CM

## 2020-01-02 DIAGNOSIS — R18.8 CIRRHOSIS OF LIVER WITH ASCITES, UNSPECIFIED HEPATIC CIRRHOSIS TYPE (HCC): ICD-10-CM

## 2020-01-02 DIAGNOSIS — R18.8 OTHER ASCITES: Primary | ICD-10-CM

## 2020-01-02 DIAGNOSIS — E87.1 HYPONATREMIA: ICD-10-CM

## 2020-01-02 DIAGNOSIS — K74.60 CIRRHOSIS OF LIVER WITH ASCITES, UNSPECIFIED HEPATIC CIRRHOSIS TYPE (HCC): ICD-10-CM

## 2020-01-02 PROBLEM — IMO0001 ALCOHOLISM /ALCOHOL ABUSE: Status: ACTIVE | Noted: 2020-01-02

## 2020-01-02 PROBLEM — K70.31 ALCOHOLIC CIRRHOSIS OF LIVER WITH ASCITES (HCC): Status: ACTIVE | Noted: 2020-01-02

## 2020-01-02 PROBLEM — D64.9 ANEMIA: Status: ACTIVE | Noted: 2020-01-02

## 2020-01-02 PROBLEM — I10 HYPERTENSION: Status: ACTIVE | Noted: 2020-01-02

## 2020-01-02 PROBLEM — D69.6 THROMBOCYTOPENIA (HCC): Status: ACTIVE | Noted: 2020-01-02

## 2020-01-02 PROBLEM — E87.6 HYPOKALEMIA: Status: ACTIVE | Noted: 2020-01-02

## 2020-01-02 PROBLEM — D69.1 THROMBOCYTASTHENIA (HCC): Status: ACTIVE | Noted: 2020-01-02

## 2020-01-02 PROBLEM — K72.90 DECOMPENSATED HEPATIC CIRRHOSIS (HCC): Status: ACTIVE | Noted: 2020-01-02

## 2020-01-02 LAB
ALBUMIN SERPL-MCNC: 2.7 G/DL (ref 3.5–5.2)
ALBUMIN/GLOB SERPL: 0.5 G/DL
ALP SERPL-CCNC: 187 U/L (ref 39–117)
ALT SERPL W P-5'-P-CCNC: 28 U/L (ref 1–41)
ANION GAP SERPL CALCULATED.3IONS-SCNC: 10.5 MMOL/L (ref 5–15)
APPEARANCE FLD: CLEAR
AST SERPL-CCNC: 66 U/L (ref 1–40)
BASOPHILS # BLD AUTO: 0.03 10*3/MM3 (ref 0–0.2)
BASOPHILS NFR BLD AUTO: 0.5 % (ref 0–1.5)
BILIRUB SERPL-MCNC: 4.5 MG/DL (ref 0.2–1.2)
BUN BLD-MCNC: 14 MG/DL (ref 6–20)
BUN/CREAT SERPL: 16.1 (ref 7–25)
CALCIUM SPEC-SCNC: 8.2 MG/DL (ref 8.6–10.5)
CHLORIDE SERPL-SCNC: 87 MMOL/L (ref 98–107)
CO2 SERPL-SCNC: 28.5 MMOL/L (ref 22–29)
COLOR FLD: YELLOW
CREAT BLD-MCNC: 0.87 MG/DL (ref 0.76–1.27)
DEPRECATED RDW RBC AUTO: 59.3 FL (ref 37–54)
EOSINOPHIL # BLD AUTO: 0.05 10*3/MM3 (ref 0–0.4)
EOSINOPHIL NFR BLD AUTO: 0.9 % (ref 0.3–6.2)
ERYTHROCYTE [DISTWIDTH] IN BLOOD BY AUTOMATED COUNT: 17.9 % (ref 12.3–15.4)
GFR SERPL CREATININE-BSD FRML MDRD: 114 ML/MIN/1.73
GFR SERPL CREATININE-BSD FRML MDRD: 94 ML/MIN/1.73
GLOBULIN UR ELPH-MCNC: 5.1 GM/DL
GLUCOSE BLD-MCNC: 90 MG/DL (ref 65–99)
HCT VFR BLD AUTO: 31.7 % (ref 37.5–51)
HGB BLD-MCNC: 11 G/DL (ref 13–17.7)
HOLD SPECIMEN: NORMAL
HOLD SPECIMEN: NORMAL
IMM GRANULOCYTES # BLD AUTO: 0.01 10*3/MM3 (ref 0–0.05)
IMM GRANULOCYTES NFR BLD AUTO: 0.2 % (ref 0–0.5)
INR PPP: 1.56 (ref 0.9–1.1)
LYMPHOCYTES # BLD AUTO: 1.05 10*3/MM3 (ref 0.7–3.1)
LYMPHOCYTES NFR BLD AUTO: 19.2 % (ref 19.6–45.3)
LYMPHOCYTES NFR FLD MANUAL: 6 %
MCH RBC QN AUTO: 32.2 PG (ref 26.6–33)
MCHC RBC AUTO-ENTMCNC: 34.7 G/DL (ref 31.5–35.7)
MCV RBC AUTO: 92.7 FL (ref 79–97)
MONOCYTES # BLD AUTO: 0.73 10*3/MM3 (ref 0.1–0.9)
MONOCYTES NFR BLD AUTO: 13.3 % (ref 5–12)
MONOCYTES NFR FLD: 7 %
MONOS+MACROS NFR FLD: 83 %
NEUTROPHILS # BLD AUTO: 3.61 10*3/MM3 (ref 1.7–7)
NEUTROPHILS NFR BLD AUTO: 65.9 % (ref 42.7–76)
NEUTROPHILS NFR FLD MANUAL: 4 %
NRBC BLD AUTO-RTO: 0.2 /100 WBC (ref 0–0.2)
PLATELET # BLD AUTO: 92 10*3/MM3 (ref 140–450)
PMV BLD AUTO: 10 FL (ref 6–12)
POTASSIUM BLD-SCNC: 3.2 MMOL/L (ref 3.5–5.2)
PROT SERPL-MCNC: 7.8 G/DL (ref 6–8.5)
PROTHROMBIN TIME: 18.4 SECONDS (ref 11.7–14.2)
RBC # BLD AUTO: 3.42 10*6/MM3 (ref 4.14–5.8)
RBC # FLD AUTO: 72 /MM3
SODIUM BLD-SCNC: 126 MMOL/L (ref 136–145)
WBC # FLD AUTO: 38 /MM3
WBC NRBC COR # BLD: 5.48 10*3/MM3 (ref 3.4–10.8)
WHOLE BLOOD HOLD SPECIMEN: NORMAL
WHOLE BLOOD HOLD SPECIMEN: NORMAL

## 2020-01-02 PROCEDURE — 87015 SPECIMEN INFECT AGNT CONCNTJ: CPT | Performed by: EMERGENCY MEDICINE

## 2020-01-02 PROCEDURE — 85610 PROTHROMBIN TIME: CPT | Performed by: EMERGENCY MEDICINE

## 2020-01-02 PROCEDURE — 87205 SMEAR GRAM STAIN: CPT | Performed by: EMERGENCY MEDICINE

## 2020-01-02 PROCEDURE — 99255 IP/OBS CONSLTJ NEW/EST HI 80: CPT | Performed by: INTERNAL MEDICINE

## 2020-01-02 PROCEDURE — 87070 CULTURE OTHR SPECIMN AEROBIC: CPT | Performed by: EMERGENCY MEDICINE

## 2020-01-02 PROCEDURE — 76942 ECHO GUIDE FOR BIOPSY: CPT

## 2020-01-02 PROCEDURE — 89051 BODY FLUID CELL COUNT: CPT | Performed by: EMERGENCY MEDICINE

## 2020-01-02 PROCEDURE — 99284 EMERGENCY DEPT VISIT MOD MDM: CPT

## 2020-01-02 PROCEDURE — 80053 COMPREHEN METABOLIC PANEL: CPT | Performed by: EMERGENCY MEDICINE

## 2020-01-02 PROCEDURE — 25010000002 ALBUMIN HUMAN 25% PER 50 ML: Performed by: NURSE PRACTITIONER

## 2020-01-02 PROCEDURE — 85025 COMPLETE CBC W/AUTO DIFF WBC: CPT | Performed by: EMERGENCY MEDICINE

## 2020-01-02 PROCEDURE — 25010000002 FENTANYL CITRATE (PF) 100 MCG/2ML SOLUTION: Performed by: EMERGENCY MEDICINE

## 2020-01-02 PROCEDURE — P9047 ALBUMIN (HUMAN), 25%, 50ML: HCPCS | Performed by: NURSE PRACTITIONER

## 2020-01-02 PROCEDURE — 25010000003 LIDOCAINE 1 % SOLUTION: Performed by: RADIOLOGY

## 2020-01-02 PROCEDURE — 0W9G3ZZ DRAINAGE OF PERITONEAL CAVITY, PERCUTANEOUS APPROACH: ICD-10-PCS | Performed by: RADIOLOGY

## 2020-01-02 RX ORDER — ONDANSETRON 2 MG/ML
4 INJECTION INTRAMUSCULAR; INTRAVENOUS EVERY 6 HOURS PRN
Status: DISCONTINUED | OUTPATIENT
Start: 2020-01-02 | End: 2020-01-05 | Stop reason: HOSPADM

## 2020-01-02 RX ORDER — PANTOPRAZOLE SODIUM 40 MG/1
40 TABLET, DELAYED RELEASE ORAL DAILY
Status: DISCONTINUED | OUTPATIENT
Start: 2020-01-02 | End: 2020-01-05 | Stop reason: HOSPADM

## 2020-01-02 RX ORDER — NITROGLYCERIN 0.4 MG/1
0.4 TABLET SUBLINGUAL
Status: DISCONTINUED | OUTPATIENT
Start: 2020-01-02 | End: 2020-01-05 | Stop reason: HOSPADM

## 2020-01-02 RX ORDER — THIAMINE MONONITRATE (VIT B1) 100 MG
100 TABLET ORAL DAILY
Status: DISCONTINUED | OUTPATIENT
Start: 2020-01-02 | End: 2020-01-05 | Stop reason: HOSPADM

## 2020-01-02 RX ORDER — CODEINE SULFATE 30 MG/1
15 TABLET ORAL EVERY 6 HOURS PRN
Status: DISCONTINUED | OUTPATIENT
Start: 2020-01-02 | End: 2020-01-05 | Stop reason: HOSPADM

## 2020-01-02 RX ORDER — SODIUM CHLORIDE 9 MG/ML
75 INJECTION, SOLUTION INTRAVENOUS CONTINUOUS
Status: DISCONTINUED | OUTPATIENT
Start: 2020-01-02 | End: 2020-01-02

## 2020-01-02 RX ORDER — SODIUM CHLORIDE 0.9 % (FLUSH) 0.9 %
10 SYRINGE (ML) INJECTION AS NEEDED
Status: DISCONTINUED | OUTPATIENT
Start: 2020-01-02 | End: 2020-01-05 | Stop reason: HOSPADM

## 2020-01-02 RX ORDER — LIDOCAINE 50 MG/G
2 PATCH TOPICAL
Status: DISCONTINUED | OUTPATIENT
Start: 2020-01-03 | End: 2020-01-05 | Stop reason: HOSPADM

## 2020-01-02 RX ORDER — ZINC SULFATE 50(220)MG
220 CAPSULE ORAL DAILY
Status: DISCONTINUED | OUTPATIENT
Start: 2020-01-02 | End: 2020-01-05 | Stop reason: HOSPADM

## 2020-01-02 RX ORDER — TIZANIDINE 4 MG/1
2 TABLET ORAL EVERY 12 HOURS PRN
Status: DISCONTINUED | OUTPATIENT
Start: 2020-01-02 | End: 2020-01-05 | Stop reason: HOSPADM

## 2020-01-02 RX ORDER — ALBUMIN (HUMAN) 12.5 G/50ML
12.5 SOLUTION INTRAVENOUS ONCE
Status: DISCONTINUED | OUTPATIENT
Start: 2020-01-02 | End: 2020-01-02

## 2020-01-02 RX ORDER — FUROSEMIDE 40 MG/1
40 TABLET ORAL 2 TIMES DAILY
Status: DISCONTINUED | OUTPATIENT
Start: 2020-01-02 | End: 2020-01-05 | Stop reason: HOSPADM

## 2020-01-02 RX ORDER — SODIUM CHLORIDE 0.9 % (FLUSH) 0.9 %
10 SYRINGE (ML) INJECTION EVERY 12 HOURS SCHEDULED
Status: DISCONTINUED | OUTPATIENT
Start: 2020-01-02 | End: 2020-01-05 | Stop reason: HOSPADM

## 2020-01-02 RX ORDER — FENTANYL CITRATE 50 UG/ML
50 INJECTION, SOLUTION INTRAMUSCULAR; INTRAVENOUS ONCE
Status: COMPLETED | OUTPATIENT
Start: 2020-01-02 | End: 2020-01-02

## 2020-01-02 RX ORDER — SPIRONOLACTONE 50 MG/1
50 TABLET, FILM COATED ORAL DAILY
Status: DISCONTINUED | OUTPATIENT
Start: 2020-01-02 | End: 2020-01-05 | Stop reason: HOSPADM

## 2020-01-02 RX ORDER — LIDOCAINE HYDROCHLORIDE 10 MG/ML
20 INJECTION, SOLUTION INFILTRATION; PERINEURAL ONCE
Status: COMPLETED | OUTPATIENT
Start: 2020-01-02 | End: 2020-01-02

## 2020-01-02 RX ORDER — ALBUMIN (HUMAN) 12.5 G/50ML
50 SOLUTION INTRAVENOUS ONCE
Status: COMPLETED | OUTPATIENT
Start: 2020-01-02 | End: 2020-01-02

## 2020-01-02 RX ORDER — FOLIC ACID 1 MG/1
1 TABLET ORAL DAILY
Status: DISCONTINUED | OUTPATIENT
Start: 2020-01-02 | End: 2020-01-05 | Stop reason: HOSPADM

## 2020-01-02 RX ORDER — POTASSIUM CHLORIDE 750 MG/1
40 CAPSULE, EXTENDED RELEASE ORAL EVERY 4 HOURS
Status: COMPLETED | OUTPATIENT
Start: 2020-01-02 | End: 2020-01-02

## 2020-01-02 RX ADMIN — SODIUM CHLORIDE 500 ML: 9 INJECTION, SOLUTION INTRAVENOUS at 11:56

## 2020-01-02 RX ADMIN — TIZANIDINE 2 MG: 4 TABLET ORAL at 20:33

## 2020-01-02 RX ADMIN — SODIUM CHLORIDE, PRESERVATIVE FREE 10 ML: 5 INJECTION INTRAVENOUS at 15:00

## 2020-01-02 RX ADMIN — FOLIC ACID 1 MG: 1 TABLET ORAL at 16:44

## 2020-01-02 RX ADMIN — SPIRONOLACTONE 50 MG: 50 TABLET, FILM COATED ORAL at 16:45

## 2020-01-02 RX ADMIN — PANTOPRAZOLE SODIUM 40 MG: 40 TABLET, DELAYED RELEASE ORAL at 16:44

## 2020-01-02 RX ADMIN — ALBUMIN HUMAN 50 G: 0.25 SOLUTION INTRAVENOUS at 14:52

## 2020-01-02 RX ADMIN — Medication 220 MG: at 16:45

## 2020-01-02 RX ADMIN — Medication 100 MG: at 16:45

## 2020-01-02 RX ADMIN — FUROSEMIDE 40 MG: 40 TABLET ORAL at 20:29

## 2020-01-02 RX ADMIN — SODIUM CHLORIDE, PRESERVATIVE FREE 10 ML: 5 INJECTION INTRAVENOUS at 11:57

## 2020-01-02 RX ADMIN — POTASSIUM CHLORIDE 40 MEQ: 750 CAPSULE, EXTENDED RELEASE ORAL at 20:29

## 2020-01-02 RX ADMIN — FENTANYL CITRATE 50 MCG: 50 INJECTION, SOLUTION INTRAMUSCULAR; INTRAVENOUS at 12:21

## 2020-01-02 RX ADMIN — SODIUM CHLORIDE, PRESERVATIVE FREE 10 ML: 5 INJECTION INTRAVENOUS at 20:29

## 2020-01-02 RX ADMIN — SODIUM CHLORIDE 75 ML/HR: 9 INJECTION, SOLUTION INTRAVENOUS at 13:47

## 2020-01-02 RX ADMIN — LIDOCAINE HYDROCHLORIDE 15 ML: 10 INJECTION, SOLUTION INFILTRATION; PERINEURAL at 10:54

## 2020-01-02 RX ADMIN — POTASSIUM CHLORIDE 40 MEQ: 750 CAPSULE, EXTENDED RELEASE ORAL at 16:44

## 2020-01-02 NOTE — CONSULTS
[facility]  Initial Inpatient Consult Note    Referring Provider: Dr Monet    Reason for Consultation: Cirrhosis, ascites    Subjective     History of present illness:    44 y.o. male unknown to our service, followed by [facility] at [facility].  He has hx of EtOH cirrhosis with recurrent ascites.  He presented to ED today c/o abdominal distention.  He has already had IR guided paracentesis with 8.4L of fluid removed.  The patient reports compliance with his diuretics at home. It appears home dosage is 40 lasix and 50 mg aldactone.      He was hospitalized at [place] from 12/7 - 12/16 for complications related to his liver disease. He last had paracentesis on 12/24 with 9L of fluid removed.  Last drink of EtOH was Saturday.  Typically drinks at least pint of whiskey/day.  He tells me he was sober for one point for nearly 10mos.  He states he was recently seen by [facility] and was told he might be candidate for listing.      He had EGD 10/2018 with Dr Alfredo with no e/o varices at that time.      CT A/P 12/24 with L rib fracture.  Advanced cirrhosis with ascites  R hepatic lobe hemangioma    Past Medical History:  Past Medical History:   Diagnosis Date   • Cirrhosis of liver with ascites (CMS/HCC)    • Concussion    • Hypertension      Past Surgical History:  History reviewed. No pertinent surgical history.   Social History:   Social History     Tobacco Use   • Smoking status: Never Smoker   • Smokeless tobacco: Never Used   Substance Use Topics   • Alcohol use: Yes     Frequency: Never     Comment: 1 pint of bouban a week ago.       Family History:  History reviewed. No pertinent family history.    Home Meds:  Medications Prior to Admission   Medication Sig Dispense Refill Last Dose   • furosemide (LASIX) 40 MG tablet Take 40 mg by mouth 2 (Two) Times a Day.   1/2/2020 at am   • spironolactone (ALDACTONE) 50 MG tablet Take 50 mg by mouth Daily.    1/2/2020 at am   • folic acid (FOLVITE) 1 MG tablet Take 1 mg by mouth Daily.   2/13/2019 at Unknown time   • pantoprazole (PROTONIX) 40 MG EC tablet Take 40 mg by mouth Daily.   Patient Taking Differently at Unknown time   • thiamine (VITAMIN B-1) 100 MG tablet Take 100 mg by mouth Daily.   2/13/2019 at Unknown time     Current Meds:     albumin human 12.5 g Intravenous Once   albumin human 50 g Intravenous Once   folic acid 1 mg Oral Daily   furosemide 40 mg Oral BID   pantoprazole 40 mg Oral Daily   potassium chloride 40 mEq Oral Q4H   sodium chloride 10 mL Intravenous Q12H   spironolactone 50 mg Oral Daily   thiamine 100 mg Oral Daily     Allergies:  Allergies   Allergen Reactions   • Mushroom Anaphylaxis   • Gluten Meal Unknown (See Comments)     unknown   • Lactose Intolerance (Gi) Unknown (See Comments)     Intolerance to milk only.      Review of Systems  All systems were reviewed and negative except for:  Gastrointestinal: positive for  bloating / distention     Objective     Vital Signs  Temp:  [97.7 °F (36.5 °C)-98.1 °F (36.7 °C)] 98.1 °F (36.7 °C)  Heart Rate:  [] 86  Resp:  [16-20] 16  BP: (110-137)/() 126/89  Physical Exam:  General Appearance:    Alert, cooperative, in no acute distress   Head:    Normocephalic, without obvious abnormality, atraumatic   Eyes:            Lids and lashes normal, conjunctivae and sclerae normal, no   icterus   Throat:   No oral lesions, no thrush, oral mucosa moist   Neck:   No adenopathy, supple, trachea midline, no thyromegaly, no   carotid bruit, no JVD   Lungs:     Clear to auscultation,respirations regular, even and                   unlabored    Heart:    Regular rhythm and normal rate, normal S1 and S2, no            murmur, no gallop, no rub, no click   Chest Wall:    No abnormalities observed   Abdomen:     Soft, no obvious fluid wave, no TTP   Rectal:     Deferred   Extremities:   no edema, no cyanosis, no redness   Skin:   No bleeding, bruising or  rash   Lymph nodes:   No palpable adenopathy   Psychiatric:  Judgement and insight: normal   Orientation to person place and time: normal   Mood and affect: normal   Results Review:   I reviewed the patient's new clinical results.  I reviewed the patient's new imaging results and agree with the interpretation.    Results from last 7 days   Lab Units 01/02/20  0839   WBC 10*3/mm3 5.48   HEMOGLOBIN g/dL 11.0*   HEMATOCRIT % 31.7*   PLATELETS 10*3/mm3 92*     Results from last 7 days   Lab Units 01/02/20  0839   SODIUM mmol/L 126*   POTASSIUM mmol/L 3.2*   CHLORIDE mmol/L 87*   CO2 mmol/L 28.5   BUN mg/dL 14   CREATININE mg/dL 0.87   CALCIUM mg/dL 8.2*   BILIRUBIN mg/dL 4.5*   ALK PHOS U/L 187*   ALT (SGPT) U/L 28   AST (SGOT) U/L 66*   GLUCOSE mg/dL 90     Results from last 7 days   Lab Units 01/02/20  0839   INR  1.56*     Lab Results   Lab Value Date/Time    LIPASE 367 (H) 12/24/2019 1527    LIPASE 337 (H) 04/21/2019 1039    LIPASE 71 (H) 12/31/2018 1620    LIPASE 343 (H) 12/17/2018 1554    LIPASE 306 (H) 10/18/2018 2246    LIPASE 305 (H) 10/18/2018 1436    LIPASE 284 08/15/2018 1757       Radiology:  US Paracentesis   Final Result   Successful diagnostic and therapeutic ultrasound guided   paracentesis.       This report was finalized on 1/2/2020 12:56 PM by Dr. Bijan Rasmussen MD.              Assessment/Plan   Assessment:   1.  EtOH cirrhosis - MELDNA is 25  2.  Recurrent ascites secondary to above  3.  Hyponatremia secondary to diuretic therapy    Plan:   Await fluid studies from today's LVP  I have ordered albumin replacement - 50gms (initial orders was only for 12.5gm)  Continue current dose diuretics and monitor Na - he is not far off from his baseline in this regard  2gm NA diet  Add zinc sulfate  BPs are ok, no indication for midodrine at this time    Prognosis is very guarded in setting of ongoing EtOH use.      He will need to f/u with [facility] group upon discharge and he will need to update [facility]  team about current events.  Unfortunately his MELD is probably too high to consider TIPS, and will therefore need to have regular paracentesis to keep him out of the hospital.  His recent EtOH use has probably disqualified him from transplant listing for the time being.       I discussed the patients findings and my recommendations with patient.         Iraj Bell M.D.  [facility]  [address] [place] [address]  Office: [phone]
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
## 2020-01-02 NOTE — ED NOTES
"Patient reports abdominal pain that started a week ago. Patient reports having a paracentesis done a week ago at [facility] and his stomach is \"already twice the size it was last time\". Patient reports calling his GI doctor on Monday but \"never got a call back so I came here\". Patient's abdomen is distended and tender upon assessment. Patient states that \"his water pills are just not working\"     Maria R Blackburn RN  01/02/20 5091    "

## 2020-01-02 NOTE — ED TRIAGE NOTES
"Pt states \"I need an emergency paracentesis.\"     Pt reports he has been needing them weekly due to liver disease.     Pt c/o abd distention and pain.  "

## 2020-01-02 NOTE — CONSULTS
[facility] was consulted due to patient's long term ETOH abuse disorder.  Patient was sitting on the side of his bed.  He was pleasant.  He did not appear to be in any distress.  Introduced self to patient and his mother who was in the room. Patient stated he is not interested in an ETOH abuse evaluation as he has quit on his own in the past.  Explained role of the Access Center.  He states he has looked into some programs that includes [facility] as well as [facility].  He denied hx of DT's as well as w/ drawal.  He denies any hx of S or HI. No hx of wishing he would die.  He states he came here b/c of his medical concerns.  He denies any concerns for his safety.      Patient's mother left the room as clinician was leaving.  She requested list of treatment resources so she would have them.  She was provided a list of treatment options.      [facility] will follow briefly as he is not wanting tx but in event he may change his mind since he just admitted today.   CIWA score was 0.  Full eval not completed as pt declined.
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
## 2020-01-03 PROBLEM — I95.9 HYPOTENSION: Status: ACTIVE | Noted: 2020-01-02

## 2020-01-03 LAB
ALBUMIN SERPL-MCNC: 2.4 G/DL (ref 3.5–5.2)
ALBUMIN/GLOB SERPL: 0.6 G/DL
ALP SERPL-CCNC: 135 U/L (ref 39–117)
ALT SERPL W P-5'-P-CCNC: 18 U/L (ref 1–41)
ANION GAP SERPL CALCULATED.3IONS-SCNC: 7.1 MMOL/L (ref 5–15)
AST SERPL-CCNC: 46 U/L (ref 1–40)
BILIRUB SERPL-MCNC: 2.9 MG/DL (ref 0.2–1.2)
BUN BLD-MCNC: 15 MG/DL (ref 6–20)
BUN/CREAT SERPL: 17.4 (ref 7–25)
CALCIUM SPEC-SCNC: 8.1 MG/DL (ref 8.6–10.5)
CHLORIDE SERPL-SCNC: 93 MMOL/L (ref 98–107)
CO2 SERPL-SCNC: 27.9 MMOL/L (ref 22–29)
CREAT BLD-MCNC: 0.86 MG/DL (ref 0.76–1.27)
DEPRECATED RDW RBC AUTO: 57.2 FL (ref 37–54)
ERYTHROCYTE [DISTWIDTH] IN BLOOD BY AUTOMATED COUNT: 17.3 % (ref 12.3–15.4)
GFR SERPL CREATININE-BSD FRML MDRD: 116 ML/MIN/1.73
GFR SERPL CREATININE-BSD FRML MDRD: 96 ML/MIN/1.73
GLOBULIN UR ELPH-MCNC: 3.8 GM/DL
GLUCOSE BLD-MCNC: 103 MG/DL (ref 65–99)
HCT VFR BLD AUTO: 24.7 % (ref 37.5–51)
HGB BLD-MCNC: 8.8 G/DL (ref 13–17.7)
MAGNESIUM SERPL-MCNC: 1.8 MG/DL (ref 1.6–2.6)
MCH RBC QN AUTO: 33 PG (ref 26.6–33)
MCHC RBC AUTO-ENTMCNC: 35.6 G/DL (ref 31.5–35.7)
MCV RBC AUTO: 92.5 FL (ref 79–97)
PLATELET # BLD AUTO: 63 10*3/MM3 (ref 140–450)
PMV BLD AUTO: 10.6 FL (ref 6–12)
POTASSIUM BLD-SCNC: 4.2 MMOL/L (ref 3.5–5.2)
PROT SERPL-MCNC: 6.2 G/DL (ref 6–8.5)
RBC # BLD AUTO: 2.67 10*6/MM3 (ref 4.14–5.8)
SODIUM BLD-SCNC: 128 MMOL/L (ref 136–145)
WBC NRBC COR # BLD: 3.99 10*3/MM3 (ref 3.4–10.8)

## 2020-01-03 PROCEDURE — 97165 OT EVAL LOW COMPLEX 30 MIN: CPT | Performed by: OCCUPATIONAL THERAPIST

## 2020-01-03 PROCEDURE — P9041 ALBUMIN (HUMAN),5%, 50ML: HCPCS | Performed by: INTERNAL MEDICINE

## 2020-01-03 PROCEDURE — 85027 COMPLETE CBC AUTOMATED: CPT | Performed by: NURSE PRACTITIONER

## 2020-01-03 PROCEDURE — 80053 COMPREHEN METABOLIC PANEL: CPT | Performed by: NURSE PRACTITIONER

## 2020-01-03 PROCEDURE — 83735 ASSAY OF MAGNESIUM: CPT | Performed by: INTERNAL MEDICINE

## 2020-01-03 PROCEDURE — 25010000002 ALBUMIN HUMAN 5% PER 50 ML: Performed by: INTERNAL MEDICINE

## 2020-01-03 PROCEDURE — 99232 SBSQ HOSP IP/OBS MODERATE 35: CPT | Performed by: INTERNAL MEDICINE

## 2020-01-03 RX ORDER — MIDODRINE HYDROCHLORIDE 5 MG/1
5 TABLET ORAL
Status: DISCONTINUED | OUTPATIENT
Start: 2020-01-03 | End: 2020-01-04

## 2020-01-03 RX ORDER — ALBUMIN, HUMAN INJ 5% 5 %
250 SOLUTION INTRAVENOUS ONCE
Status: COMPLETED | OUTPATIENT
Start: 2020-01-03 | End: 2020-01-03

## 2020-01-03 RX ADMIN — MIDODRINE HYDROCHLORIDE 5 MG: 5 TABLET ORAL at 16:44

## 2020-01-03 RX ADMIN — SODIUM CHLORIDE, PRESERVATIVE FREE 10 ML: 5 INJECTION INTRAVENOUS at 21:02

## 2020-01-03 RX ADMIN — ALBUMIN HUMAN 250 ML: 0.05 INJECTION, SOLUTION INTRAVENOUS at 14:00

## 2020-01-03 RX ADMIN — LIDOCAINE 2 PATCH: 50 PATCH CUTANEOUS at 09:03

## 2020-01-03 RX ADMIN — CODEINE SULFATE 15 MG: 30 TABLET ORAL at 23:28

## 2020-01-03 RX ADMIN — FUROSEMIDE 40 MG: 40 TABLET ORAL at 21:02

## 2020-01-03 RX ADMIN — Medication 220 MG: at 09:02

## 2020-01-03 RX ADMIN — CODEINE SULFATE 15 MG: 30 TABLET ORAL at 16:44

## 2020-01-03 RX ADMIN — Medication 100 MG: at 09:02

## 2020-01-03 RX ADMIN — MIDODRINE HYDROCHLORIDE 5 MG: 5 TABLET ORAL at 09:52

## 2020-01-03 RX ADMIN — FOLIC ACID 1 MG: 1 TABLET ORAL at 09:02

## 2020-01-03 RX ADMIN — TIZANIDINE 2 MG: 4 TABLET ORAL at 21:02

## 2020-01-03 RX ADMIN — PANTOPRAZOLE SODIUM 40 MG: 40 TABLET, DELAYED RELEASE ORAL at 09:52

## 2020-01-03 NOTE — PROGRESS NOTES
[facility]  Inpatient Progress Note    Reason for Follow Up:  Cirrhosis, ascites    Subjective     Interval History:   He c/o some muscle cramping this am.      Current Facility-Administered Medications:   •  codeine tablet 15 mg, 15 mg, Oral, Q6H PRN, Dago Monet MD  •  folic acid (FOLVITE) tablet 1 mg, 1 mg, Oral, Daily, Trini Key APRN, 1 mg at 01/03/20 0902  •  furosemide (LASIX) tablet 40 mg, 40 mg, Oral, BID, Trini Key APRN, 40 mg at 01/02/20 2029  •  lidocaine (LIDODERM) 5 % 2 patch, 2 patch, Transdermal, Q24H, Dago Monet MD, 2 patch at 01/03/20 0903  •  midodrine (PROAMATINE) tablet 5 mg, 5 mg, Oral, TID AC, Dago Monet MD, 5 mg at 01/03/20 0952  •  nitroglycerin (NITROSTAT) SL tablet 0.4 mg, 0.4 mg, Sublingual, Q5 Min PRN, Trini Key APRN  •  ondansetron (ZOFRAN) injection 4 mg, 4 mg, Intravenous, Q6H PRN, Trini Key APRN  •  pantoprazole (PROTONIX) EC tablet 40 mg, 40 mg, Oral, Daily, Trini Key APRN, 40 mg at 01/03/20 0952  •  [COMPLETED] Insert peripheral IV, , , Once **AND** sodium chloride 0.9 % flush 10 mL, 10 mL, Intravenous, PRN, Bruno Smith MD, 10 mL at 01/02/20 1157  •  sodium chloride 0.9 % flush 10 mL, 10 mL, Intravenous, Q12H, Trini Key APRN, 10 mL at 01/02/20 2029  •  sodium chloride 0.9 % flush 10 mL, 10 mL, Intravenous, PRN, Trini Key APRN  •  spironolactone (ALDACTONE) tablet 50 mg, 50 mg, Oral, Daily, Trini Key APRN, 50 mg at 01/02/20 1645  •  thiamine (VITAMIN B-1) tablet 100 mg, 100 mg, Oral, Daily, Trini Key APRN, 100 mg at 01/03/20 0902  •  tiZANidine (ZANAFLEX) tablet 2 mg, 2 mg, Oral, Q12H PRN, Dago Monet MD, 2 mg at 01/02/20 2033  •  zinc sulfate (ZINCATE) capsule 220 mg, 220 mg, Oral, Daily, Iraj Bell MD, 220 mg at 01/03/20 0902  Review of Systems:    All systems were reviewed and negative except for:  Musculoskeletal: positive for  muscle pain    Objective      Vital Signs  Temp:  [97.3 °F (36.3 °C)-98.1 °F (36.7 °C)] 97.3 °F (36.3 °C)  Heart Rate:  [60-91] 60  Resp:  [16-18] 18  BP: ()/(56-89) 85/58  Body mass index is 21.6 kg/m².    Intake/Output Summary (Last 24 hours) at 1/3/2020 1042  Last data filed at 1/3/2020 0808  Gross per 24 hour   Intake 960 ml   Output 8880 ml   Net -7920 ml     I/O this shift:  In: 360 [P.O.:360]  Out: 280 [Urine:280]     Physical Exam:   General: patient awake, alert and cooperative   Eyes: Normal lids and lashes, no scleral icterus   Neck: supple, normal ROM   Skin: warm and dry, not jaundiced   Cardiovascular: regular rhythm and rate, no murmurs auscultated   Pulm: clear to auscultation bilaterally, regular and unlabored   Abdomen: soft, nontender, nondistended; normal bowel sounds   Rectal: deferred   Extremities: no rash or edema   Psychiatric: Normal mood and behavior; memory intact     Results Review:     I reviewed the patient's new clinical results.    Results from last 7 days   Lab Units 01/03/20  0432 01/02/20  0839   WBC 10*3/mm3 3.99 5.48   HEMOGLOBIN g/dL 8.8* 11.0*   HEMATOCRIT % 24.7* 31.7*   PLATELETS 10*3/mm3 63* 92*     Results from last 7 days   Lab Units 01/03/20  0432 01/02/20  0839   SODIUM mmol/L 128* 126*   POTASSIUM mmol/L 4.2 3.2*   CHLORIDE mmol/L 93* 87*   CO2 mmol/L 27.9 28.5   BUN mg/dL 15 14   CREATININE mg/dL 0.86 0.87   CALCIUM mg/dL 8.1* 8.2*   BILIRUBIN mg/dL 2.9* 4.5*   ALK PHOS U/L 135* 187*   ALT (SGPT) U/L 18 28   AST (SGOT) U/L 46* 66*   GLUCOSE mg/dL 103* 90     Results from last 7 days   Lab Units 01/02/20  0839   INR  1.56*     Lab Results   Lab Value Date/Time    LIPASE 367 (H) 12/24/2019 1527    LIPASE 337 (H) 04/21/2019 1039    LIPASE 71 (H) 12/31/2018 1620    LIPASE 343 (H) 12/17/2018 1554    LIPASE 306 (H) 10/18/2018 2246    LIPASE 305 (H) 10/18/2018 1436    LIPASE 284 08/15/2018 1757       Assessment/Plan   Assessment:   1.  EtOH cirrhosis - MELDNA is 25 on admission  2.  Recurrent  ascites secondary to above  3.  Hyponatremia secondary to diuretic therapy  4.  Anemia   5.  Muscle cramps    Plan:   LVP yesterday with no e/o SBP  Continue current dose diuretics, would not titrate further given his borderline Na  Continue 2GM NA diet  His Hb has dropped since admission but is now more in keeping with baseline.  He is a little hypotensive this am - will give additional dose of albumin and may need to consider midodrine  Zinc replacement has been started for his muscle cramps.  Will check magnesium levels as well.    I discussed the patients findings and my recommendations with patient.         Iraj Bell M.D.  [facility]  [address] [place] [address]  Office: [phone]

## 2020-01-03 NOTE — CONSULTS
"Adult Nutrition  Assessment/PES    Patient Name:  Aramis Baez  YOB: 1975  MRN: [id number]  Admit Date:  1/2/2020    Assessment Date:  1/3/2020    Comments:  Consult: Screen. 44 y.o male with h/o ETOH cirrhosis and recurrent ascites. States he has not had good nutrition prior to admission. Decreased appetite. CBW: 133 lbs, BMI 21.  On Thiamine, Zinc Sulfate 220mg daily, folic acid, and lasix.  States he would like to try Boost to help his nutrition.   Recommend: Boost Plus BID, Will order. (Continues on 2gm Na diet and 1500mL fluid restriction)     RD to continue to follow.     Reason for Assessment     Row Name 01/03/20 1327          Reason for Assessment    Reason For Assessment  nurse/nurse practitioner consult     Diagnosis  liver disease Alcoholic fatty liver, Cirrhosis, h/o alcoholism, HTN, s/p paracentesis     Identified At Risk by Screening Criteria  other (see comments) h/o alcohol abuse         Nutrition/Diet History     Row Name 01/03/20 1329          Nutrition/Diet History    Typical Food/Fluid Intake  Patient states he wasn't eating much prior to admission.           Anthropometrics     Row Name 01/03/20 1332 01/03/20 1330       Anthropometrics    Height  167.6 cm (65.98\")  --       Admit Weight    Admit Weight  --  60.7 kg (133 lb 13.1 oz) 1/2       Ideal Body Weight (IBW)    Ideal Body Weight (IBW) (kg)  65.26  --       Body Mass Index (BMI)    BMI Assessment  --  BMI 18.5-24.9: normal        Labs/Tests/Procedures/Meds     Row Name 01/03/20 1331          Labs/Procedures/Meds    Lab Results Reviewed  reviewed, pertinent     Lab Results Comments  Na, Cl, Gluc, Albumin        Diagnostic Tests/Procedures    Diagnostic Test/Procedure Reviewed  reviewed        Medications    Pertinent Medications Reviewed  reviewed     Pertinent Medications Comments  Golic Acid, Lasix, Protonix, Thiamine, Zinc Sulfate 220mg daily          Physical Findings     Row Name 01/03/20 1332          Physical Findings " "   Skin  -- Juice: 18         Estimated/Assessed Needs     Row Name 01/03/20 1332          Calculation Measurements    Weight Used For Calculations  60.7 kg (133 lb 13.1 oz)     Height  167.6 cm (65.98\")        Estimated/Assessed Needs    Additional Documentation  Fluid Requirements (Group);Protein Requirements (Group);KCAL/KG (Group)        KCAL/KG    KCAL/KG  25 Kcal/Kg (kcal);30 Kcal/Kg (kcal)     25 Kcal/Kg (kcal)  1517.5     30 Kcal/Kg (kcal)  1821        Protein Requirements    Weight Used For Protein Calculations  60.7 kg (133 lb 13.1 oz)     Est Protein Requirement Amount (gms/kg)  1.0 gm protein     Estimated Protein Requirements (gms/day)  60.7        Fluid Requirements    Estimated Fluid Requirements (mL/day)  1821     Estimated Fluid Requirement Method  RDA Method     RDA Method (mL)  1821     Megan-Laila Method (over 20 kg)  2714         Nutrition Prescription Ordered     Row Name 01/03/20 1333          Nutrition Prescription PO    Current PO Diet  Regular     Fluid Consistency  Thin     Common Modifiers  Low Sodium;Fluid Restriction     Fluid Restriction mL per Day  1500 mL                 Problem/Interventions:  Problem 1     Row Name 01/03/20 1334          Nutrition Diagnoses Problem 1    Problem 1  Increased Nutrient Needs     Macronutrient  Protein;Kcal     Micronutrient  Vitamin     Etiology (related to)  Medical Diagnosis     Hepatic  Cirrhosis     Signs/Symptoms (evidenced by)  Report of Mnimal PO Intake               Intervention Goal     Row Name 01/03/20 1334          Intervention Goal    General  Maintain nutrition     PO  Increase intake     Weight  Maintain weight         Nutrition Intervention     Row Name 01/03/20 1335          Nutrition Intervention    RD/Tech Action  Follow Tx progress;Care plan reviewd;Recommend/ordered     Recommended/Ordered  Supplement         Nutrition Prescription     Row Name 01/03/20 1335          Other Orders    Supplement  Other (comment)     Supplement " Ordered?  Yes Boost Plus         Education/Evaluation     Row Name 01/03/20 6426          Education    Education  Will Instruct as appropriate        Monitor/Evaluation    Monitor  Per protocol;I&O;PO intake;Supplement intake;Weight;Skin status;Symptoms           Electronically signed by:  Sarah Shook RD  01/03/20 1:37 PM
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
## 2020-01-03 NOTE — PLAN OF CARE
Problem: Patient Care Overview  Goal: Plan of Care Review  Flowsheets  Taken 1/3/2020 0651 by Vanesa Conklin RN  Progress: no change  Taken 1/3/2020 1343 by Peg Ledesma OTR  Plan of Care Reviewed With: patient  Outcome Summary: pt evaluated for OT, he was independent w. ADL, tsf, and walking to BR, sit to stand from toilet independent as well. no defecits that require skilled OT. pt agrees and OT signing off.

## 2020-01-03 NOTE — PAYOR COMM NOTE
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    Lymphocytes, Absolute 1.05 0.70 - 3.10 10*3/mm3    Monocytes, Absolute 0.73 0.10 - 0.90 10*3/mm3    Eosinophils, Absolute 0.05 0.00 - 0.40 10*3/mm3    Basophils, Absolute 0.03 0.00 - 0.20 10*3/mm3    Immature Grans, Absolute 0.01 0.00 - 0.05 10*3/mm3    nRBC 0.2 0.0 - 0.2 /100 WBC   Body fluid cell count - Body Fluid, Peritoneum    Collection Time: 01/02/20 10:55 AM   Result Value Ref Range    Color, Fluid Yellow     Appearance, Fluid Clear Clear    WBC, Fluid 38 /mm3    RBC, Fluid 72 /mm3   Body fluid differential - Body Fluid, Peritoneum    Collection Time: 01/02/20 10:55 AM   Result Value Ref Range    Neutrophils, Fluid 4 %    Lymphocytes, Fluid 6 %    Monocytes, Fluid 7 %    Mononuclear, Fluid 83 %       Ordered the above labs and independently reviewed the results.        RADIOLOGY  Us Paracentesis    Result Date: 1/2/2020  ULTRASOUND-GUIDED PARACENTESIS performed on 01/02/2020.  CLINICAL INDICATION: 44-year-old male with abdominal distention and ascites now for diagnostic and therapeutic fluid removal.  COMPARISON: 02/14/2019.  PROCEDURE: Written and verbal consent was obtained for ultrasound paracentesis.  \"Time out\" was observed to verify the " patient's identity and the correct procedure. The location of the ascites was confirmed with ultrasound and an anterior lateral approach was chosen. The anterior abdomen was prepped and draped in the usual sterile fashion and 1% lidocaine without epinephrine was utilized for local anesthesia. A small skin incision was made with a scalpel and a 5 Citizen of [place] Yueh catheter with internal stylet was introduced into the ascites.  A total of 8.4 L of fluid was obtained. Specimen samples were obtained and sent to the pathology department.  Upon completion of the procedure, manual compression was applied to the paracentesis incision site until all appreciable bleeding subsided and a sterile dressing was applied.  The patient tolerated the procedure well and no immediate complications occurred.      Successful diagnostic and therapeutic ultrasound guided paracentesis.  This report was finalized on 1/2/2020 12:56 PM by Dr. Bijan Rasmussen MD.        I ordered the above noted radiological studies. Reviewed by me and discussed with radiologist.  See dictation for official radiology interpretation.      PROCEDURES  Procedures    MEDICATIONS GIVEN IN ER    Medications   sodium chloride 0.9 % flush 10 mL (10 mL Intravenous Given 1/2/20 1157)   sodium chloride 0.9 % infusion (75 mL/hr Intravenous New Bag 1/2/20 1347)   albumin human 25 % IV SOLN 12.5 g (has no administration in time range)   sodium chloride 0.9 % flush 10 mL (has no administration in time range)   sodium chloride 0.9 % flush 10 mL (has no administration in time range)   nitroglycerin (NITROSTAT) SL tablet 0.4 mg (has no administration in time range)   ondansetron (ZOFRAN) injection 4 mg (has no administration in time range)   albumin human 25 % IV SOLN 50 g (has no administration in time range)   folic acid (FOLVITE) tablet 1 mg (has no administration in time range)   pantoprazole (PROTONIX) EC tablet 40 mg (has no administration in time range)   thiamine (VITAMIN B-1)  tablet 100 mg (has no administration in time range)   furosemide (LASIX) tablet 40 mg (has no administration in time range)   spironolactone (ALDACTONE) tablet 50 mg (has no administration in time range)   potassium chloride (MICRO-K) CR capsule 40 mEq (has no administration in time range)   lidocaine (XYLOCAINE) 1 % injection 20 mL (15 mL Subcutaneous Given 1/2/20 1054)   sodium chloride 0.9 % bolus 500 mL (500 mL Intravenous New Bag 1/2/20 1156)   fentaNYL citrate (PF) (SUBLIMAZE) injection 50 mcg (50 mcg Intravenous Given 1/2/20 1221)         PROGRESS, DATA ANALYSIS, CONSULTS, AND MEDICAL DECISION MAKING    1106- Call placed to [facility]    1140- Discussed pt's case with Trini lew NP ([facility]) who agrees with plan to admit the pt for further care to Dr. Monet ([facility]).    1208- Rechecked pt who is resting comfortably in NAD. Informed pt of the lab results. D/w pt the plan for admission. Pt understands and agrees with plan. All questions answered.          All labs have been independently reviewed by me.  All radiology studies have been reviewed by me and discussed with radiologist dictating the report.   EKG's independently viewed and interpreted by me.  Discussion below represents my analysis of pertinent findings related to patient's condition, differential diagnosis, treatment plan and final disposition.           AS OF 2:32 PM VITALS:    BP - 126/89  HR - 86  TEMP - 98.1 °F (36.7 °C) (Oral)  O2 SATS - 99%        DIAGNOSIS  Final diagnoses:   Other ascites   Cirrhosis of liver with ascites, unspecified hepatic cirrhosis type (CMS/HCC)   Hyponatremia   Hypoalbuminemia         DISPOSITION  ADMISSION    Discussed treatment plan and reason for admission with pt/family and admitting physician.  Pt/family voiced understanding of the plan for admission for further testing/treatment as needed.             Documentation assistance provided by ira Nava for Bruno Smith MD.  Information recorded by the ira was done  at my direction and has been verified and validated by me.       NavaSarahRimma  01/02/20 1210       Bruno Smith MD  01/02/20 1433      Electronically signed by Bruno Smith MD at 01/02/20 1433     Annamarie Costello, RN at 01/02/20 1022        Pt was unable to give UA prior to leaving for US.      Annamarie Costello RN  01/02/20 1022      Electronically signed by Annamarie Costello RN at 01/02/20 1022     Oly Murphy RN at 01/02/20 1139        Per US, 8,400cc of fluid removed during paracentesis      Oly Murphy RN  01/02/20 1139      Electronically signed by Oly Murphy RN at 01/02/20 1139     Oly Murphy RN at 01/02/20 1203        Attempted report x 1     Oly Murphy RN  01/02/20 1204      Electronically signed by Oly Murphy RN at 01/02/20 1204       Lines, Drains & Airways    Active LDAs     Name:   Placement date:   Placement time:   Site:   Days:    Peripheral IV 01/02/20 0839 Right Antecubital   01/02/20    0839    Antecubital   1         Inactive LDAs     None                CIWA (last day)     Date/Time CIWA-Ar Score    01/03/20 0800  0    01/03/20 0000  0    01/02/20 2034  0          Facility-Administered Medications as of 1/3/2020   Medication Dose Route Frequency Provider Last Rate Last Dose   • [COMPLETED] albumin human 25 % IV SOLN 50 g  50 g Intravenous Once Trini Key APRN   Stopped at 01/02/20 1819   • [COMPLETED] albumin human 5 % solution 250 mL  250 mL Intravenous Once Iraj Bell MD   250 mL at 01/03/20 1400   • codeine tablet 15 mg  15 mg Oral Q6H PRN Dago Monet MD       • [COMPLETED] fentaNYL citrate (PF) (SUBLIMAZE) injection 50 mcg  50 mcg Intravenous Once Bruno Smith MD   50 mcg at 01/02/20 1221   • folic acid (FOLVITE) tablet 1 mg  1 mg Oral Daily Trini Key APRN   1 mg at 01/03/20 0902   • furosemide (LASIX) tablet 40 mg  40 mg Oral BID Trini Key APRN   40 mg at 01/02/20 2029   • lidocaine (LIDODERM)  5 % 2 patch  2 patch Transdermal Q24H Dago Monet MD   2 patch at 01/03/20 0903   • [COMPLETED] lidocaine (XYLOCAINE) 1 % injection 20 mL  20 mL Subcutaneous Once Bijan Rasmussen MD   15 mL at 01/02/20 1054   • midodrine (PROAMATINE) tablet 5 mg  5 mg Oral TID AC Dago Monet MD   5 mg at 01/03/20 0952   • nitroglycerin (NITROSTAT) SL tablet 0.4 mg  0.4 mg Sublingual Q5 Min PRN Trini Key APRN       • ondansetron (ZOFRAN) injection 4 mg  4 mg Intravenous Q6H PRN Trini Key APRN       • pantoprazole (PROTONIX) EC tablet 40 mg  40 mg Oral Daily Trini Key APRN   40 mg at 01/03/20 0952   • [COMPLETED] potassium chloride (MICRO-K) CR capsule 40 mEq  40 mEq Oral Q4H Trini Key APRN   40 mEq at 01/02/20 2029   • [COMPLETED] sodium chloride 0.9 % bolus 500 mL  500 mL Intravenous Once Bruno Smith MD 1,000 mL/hr at 01/02/20 1501     • sodium chloride 0.9 % flush 10 mL  10 mL Intravenous PRN Bruno Smith MD   10 mL at 01/02/20 1157   • sodium chloride 0.9 % flush 10 mL  10 mL Intravenous Q12H Trini Key APRN   10 mL at 01/02/20 2029   • sodium chloride 0.9 % flush 10 mL  10 mL Intravenous PRN Trini Key APRN       • spironolactone (ALDACTONE) tablet 50 mg  50 mg Oral Daily Trini eKy APRN   50 mg at 01/02/20 1645   • thiamine (VITAMIN B-1) tablet 100 mg  100 mg Oral Daily Trini Key APRN   100 mg at 01/03/20 0902   • tiZANidine (ZANAFLEX) tablet 2 mg  2 mg Oral Q12H PRN Dago Monet MD   2 mg at 01/02/20 2033   • zinc sulfate (ZINCATE) capsule 220 mg  220 mg Oral Daily Iraj Bell MD   220 mg at 01/03/20 0902     Orders (last 72 hrs)      Start     Ordered    01/03/20 1800  Dietary Nutrition Supplements Boost Plus (Ensure Enlive, Ensure Plus); chocolate  2 Times Daily      01/03/20 1348    01/03/20 1200  albumin human 5 % solution 250 mL  Once      01/03/20 1102    01/03/20 1103  Magnesium  Once      01/03/20 1103    01/03/20 1000  midodrine  (PROAMATINE) tablet 5 mg  3 Times Daily Before Meals      01/03/20 0906    01/03/20 0900  lidocaine (LIDODERM) 5 % 2 patch  Every 24 Hours Scheduled      01/02/20 1904    01/03/20 0600  Basic Metabolic Panel  Morning Draw,   Status:  Canceled      01/02/20 1327    01/03/20 0600  CBC (No Diff)  Morning Draw      01/02/20 1327    01/03/20 0600  Comprehensive Metabolic Panel  Morning Draw      01/02/20 1327    01/02/20 2100  furosemide (LASIX) tablet 40 mg  2 Times Daily      01/02/20 1326    01/02/20 1903  codeine tablet 15 mg  Every 6 Hours PRN      01/02/20 1904    01/02/20 1903  tiZANidine (ZANAFLEX) tablet 2 mg  Every 12 Hours PRN      01/02/20 1904    01/02/20 1824  Inpatient Admission  Once      01/02/20 1823    01/02/20 1824  PT Consult: Eval & Treat As Tolerated; Discharge Placement Assessment  Once      01/02/20 1823    01/02/20 1824  OT Consult: Eval & Treat  Once      01/02/20 1823    01/02/20 1600  Vital Signs  Every 4 Hours      01/02/20 1252    01/02/20 1600  folic acid (FOLVITE) tablet 1 mg  Daily      01/02/20 1326    01/02/20 1600  pantoprazole (PROTONIX) EC tablet 40 mg  Daily      01/02/20 1326    01/02/20 1600  thiamine (VITAMIN B-1) tablet 100 mg  Daily      01/02/20 1326    01/02/20 1600  spironolactone (ALDACTONE) tablet 50 mg  Daily      01/02/20 1326    01/02/20 1600  zinc sulfate (ZINCATE) capsule 220 mg  Daily      01/02/20 1448    01/02/20 1415  potassium chloride (MICRO-K) CR capsule 40 mEq  Every 4 Hours      01/02/20 1329    01/02/20 1400  albumin human 25 % IV SOLN 50 g  Once      01/02/20 1304    01/02/20 1339  Inpatient Case Management  Consult  Once     Provider:  (Not yet assigned)    01/02/20 1338    01/02/20 1339  Inpatient Nutrition Consult  Once     Provider:  (Not yet assigned)    01/02/20 1338    01/02/20 1332  Clinical [place] Withdrawal Assessment  Every Shift      01/02/20 1331    01/02/20 1331  Creatinine, Urine, Random - Urine, Clean Catch  Once       01/02/20 1330    01/02/20 1331  Osmolality, Urine - Urine, Clean Catch  Once      01/02/20 1330    01/02/20 1330  Sodium, Urine, Random - Urine, Clean Catch  Once      01/02/20 1330    01/02/20 1327  Inpatient Gastroenterology Consult  Once     Specialty:  Gastroenterology  Provider:  Zuleyma Wray MD    01/02/20 1326    01/02/20 1327  Inpatient Access Center Consult  Once     Provider:  (Not yet assigned)    01/02/20 1326    01/02/20 1254  sodium chloride 0.9 % flush 10 mL  Every 12 Hours Scheduled      01/02/20 1252    01/02/20 1253  Diet Regular; Low Sodium, Daily Fluid Restriction; 1500 mL Fluid Per Day  Diet Effective Now      01/02/20 1253    01/02/20 1252  ondansetron (ZOFRAN) injection 4 mg  Every 6 Hours PRN      01/02/20 1253    01/02/20 1252  Intake & Output  Every Shift      01/02/20 1252    01/02/20 1252  Weigh Patient  Once      01/02/20 1252    01/02/20 1252  Oxygen Therapy- Nasal Cannula; Titrate for SPO2: 90% - 95%  Continuous      01/02/20 1252    01/02/20 1252  Insert Peripheral IV  Once      01/02/20 1252    01/02/20 1252  Saline Lock & Maintain IV Access  Continuous,   Status:  Canceled      01/02/20 1252    01/02/20 1252  Code Status and Medical Interventions:  Continuous      01/02/20 1252    01/02/20 1252  Place Sequential Compression Device  Once      01/02/20 1252    01/02/20 1252  Maintain Sequential Compression Device  Continuous      01/02/20 1252    01/02/20 1252  Telemetry - Maintain IV Access  Continuous      01/02/20 1252    01/02/20 1252  Continuous Cardiac Monitoring  Continuous      01/02/20 1252    01/02/20 1252  May Be Off Telemetry for Tests  Continuous      01/02/20 1252    01/02/20 1252  ACLS Protocol For Life Threatening Dysrhythmias (Unless Code Status Indicates Otherwise)  Continuous      01/02/20 1252    01/02/20 1252  Notify Provider if ACLS Protocol Activated  Until Discontinued      01/02/20 1252    01/02/20 1251  Oxygen Therapy- Nasal Cannula; Titrate for SPO2: 90%  - 95%  Continuous PRN,   Status:  Canceled     Comments:  If Patient Develops Unresponsiveness, Acute Dyspnea, Cyanosis or Suspected Hypoxemia Start Continuous Pulse Ox Monitoring, Apply Oxygen & Notify Provider    01/02/20 1252    01/02/20 1251  nitroglycerin (NITROSTAT) SL tablet 0.4 mg  Every 5 Minutes PRN      01/02/20 1252    01/02/20 1251  sodium chloride 0.9 % flush 10 mL  As Needed      01/02/20 1252    01/02/20 1246  Body fluid differential - Body Fluid, Peritoneum  Once      01/02/20 1245    01/02/20 1211  fentaNYL citrate (PF) (SUBLIMAZE) injection 50 mcg  Once      01/02/20 1209    01/02/20 1146  albumin human 25 % IV SOLN 12.5 g  Once,   Status:  Discontinued      01/02/20 1144    01/02/20 1146  Tele Bed Request  Once      01/02/20 1145    01/02/20 1145  Initiate Observation Status  Once      01/02/20 1145    01/02/20 1143  sodium chloride 0.9 % bolus 500 mL  Once      01/02/20 1141    01/02/20 1143  sodium chloride 0.9 % infusion  Continuous,   Status:  Discontinued      01/02/20 1141    01/02/20 1107  [facility]HA (on-call MD unless specified) Details  Once     Specialty:  Hospitalist  Provider:  (Not yet assigned)    01/02/20 1106    01/02/20 1058  lidocaine (XYLOCAINE) 1 % injection 20 mL  Once      01/02/20 1056    01/02/20 1057  Body Fluid Cell Count With Differential - Body Fluid, Peritoneum  STAT      01/02/20 1056    01/02/20 1057  Body Fluid Culture - Body Fluid, Peritoneum  STAT      01/02/20 1056    01/02/20 1057  Body fluid cell count - Body Fluid, Peritoneum  PROCEDURE ONCE      01/02/20 1056    01/02/20 1022  Obtain Informed Consent  Once      01/02/20 1021    01/02/20 0924  CBC Auto Differential  Once      01/02/20 0923    01/02/20 0914  US Paracentesis  1 Time Imaging      01/02/20 0914    01/02/20 0913  CBC & Differential  Once      01/02/20 0912    01/02/20 0913  Comprehensive Metabolic Panel  Once      01/02/20 0912    01/02/20 0913  Protime-INR  Once      01/02/20 0912 01/02/20 0913   Urinalysis With Microscopic If Indicated (No Culture) - Urine, Clean Catch  Once      01/02/20 0912    01/02/20 0913  Insert peripheral IV  Once      01/02/20 0912    01/02/20 0912  sodium chloride 0.9 % flush 10 mL  As Needed      01/02/20 0912    01/02/20 0840  Hooks Draw  Once      01/02/20 0839    01/02/20 0840  Light Blue Top  PROCEDURE ONCE      01/02/20 0839    01/02/20 0840  Green Top (Gel)  PROCEDURE ONCE      01/02/20 0839    01/02/20 0840  Lavender Top  PROCEDURE ONCE      01/02/20 0839    01/02/20 0840  Gold Top - SST  PROCEDURE ONCE      01/02/20 0839    Unscheduled  Telemetry - Pulse Oximetry  Continuous PRN     Comments:  If Patient Develops Unresponsiveness, Acute Dyspnea, Cyanosis or Suspected Hypoxemia Start Continuous Pulse Ox Monitoring, Apply Oxygen & Notify Provider    01/02/20 1252    Unscheduled  ECG 12 Lead  As Needed     Comments:  Nurse to Release if Patient Expericences Acute Chest Pain or Dysrhythmias    01/02/20 1252    Unscheduled  Potassium  As Needed     Comments:  For Ventricular Arrhythmias      01/02/20 1252    Unscheduled  Magnesium  As Needed     Comments:  For Ventricular Arrhythmias      01/02/20 1252    Unscheduled  Troponin  As Needed     Comments:  For Chest Pain      01/02/20 1252    Unscheduled  Digoxin Level  As Needed     Comments:  For Atrial Arrhythmias      01/02/20 1252    Unscheduled  Blood Gas, Arterial  As Needed     Comments:  Per O2 PolicyNotify Physician      01/02/20 1252    Pending  Obtain Informed Consent  Once      Pending    Pending  Have Patient Void Prior to Procedure  Once      Pending    Pending  Verify Discontinuation of Anticoagulants  Once     Comments:  Unless Designated by Physician, Discontinue Anticoagulants Prior to Procedure:  - Aspirin & Plavix 5-7 Days  - Coumadin (Warfarin) 3-5 Days; If Patient Has Taken Within 72 Hours of Exam, Order INR Prior to Appointment  - Lovenox 12 Hours  - Heparin 4 Hours    Pending    Pending  Vital Signs  Once       Pending    Pending  Pulse Oximetry  Once      Pending    Pending  Pain Assessment  As Needed      Pending    Pending  NPO Diet  Diet Effective Now      Pending    Pending  Oxygen Therapy-  Continuous PRN      Pending                   Physician Progress Notes       Iraj Bell MD at 01/03/20 1042          [facility] ([facility]  Inpatient Progress Note    Reason for Follow Up:  Cirrhosis, ascites    Subjective     Interval History:   He c/o some muscle cramping this am.      Current Facility-Administered Medications:   •  codeine tablet 15 mg, 15 mg, Oral, Q6H PRN, Dago Monet MD  •  folic acid (FOLVITE) tablet 1 mg, 1 mg, Oral, Daily, Trini Key APRN, 1 mg at 01/03/20 0902  •  furosemide (LASIX) tablet 40 mg, 40 mg, Oral, BID, Trini Key APRN, 40 mg at 01/02/20 2029  •  lidocaine (LIDODERM) 5 % 2 patch, 2 patch, Transdermal, Q24H, Dago Monet MD, 2 patch at 01/03/20 0903  •  midodrine (PROAMATINE) tablet 5 mg, 5 mg, Oral, TID AC, Dago Monet MD, 5 mg at 01/03/20 0952  •  nitroglycerin (NITROSTAT) SL tablet 0.4 mg, 0.4 mg, Sublingual, Q5 Min PRN, Trini Key APRN  •  ondansetron (ZOFRAN) injection 4 mg, 4 mg, Intravenous, Q6H PRN, Trini Key APRN  •  pantoprazole (PROTONIX) EC tablet 40 mg, 40 mg, Oral, Daily, Trini Key APRN, 40 mg at 01/03/20 0952  •  [COMPLETED] Insert peripheral IV, , , Once **AND** sodium chloride 0.9 % flush 10 mL, 10 mL, Intravenous, PRN, Bruno Smith MD, 10 mL at 01/02/20 1157  •  sodium chloride 0.9 % flush 10 mL, 10 mL, Intravenous, Q12H, Trini Key APRN, 10 mL at 01/02/20 2029  •  sodium chloride 0.9 % flush 10 mL, 10 mL, Intravenous, PRN, Trini Key APRN  •  spironolactone (ALDACTONE) tablet 50 mg, 50 mg, Oral, Daily, Trini Key APRN, 50 mg at 01/02/20 1645  •  thiamine (VITAMIN B-1) tablet 100 mg, 100 mg, Oral, Daily, Trini Key APRN, 100 mg at 01/03/20 0902  •  tiZANidine (ZANAFLEX) tablet 2  mg, 2 mg, Oral, Q12H PRN, Dago Monet MD, 2 mg at 01/02/20 2033  •  zinc sulfate (ZINCATE) capsule 220 mg, 220 mg, Oral, Daily, Iraj Bell MD, 220 mg at 01/03/20 0902  Review of Systems:    All systems were reviewed and negative except for:  Musculoskeletal: positive for  muscle pain    Objective     Vital Signs  Temp:  [97.3 °F (36.3 °C)-98.1 °F (36.7 °C)] 97.3 °F (36.3 °C)  Heart Rate:  [60-91] 60  Resp:  [16-18] 18  BP: ()/(56-89) 85/58  Body mass index is 21.6 kg/m².    Intake/Output Summary (Last 24 hours) at 1/3/2020 1042  Last data filed at 1/3/2020 0808  Gross per 24 hour   Intake 960 ml   Output 8880 ml   Net -7920 ml     I/O this shift:  In: 360 [P.O.:360]  Out: 280 [Urine:280]     Physical Exam:   General: patient awake, alert and cooperative   Eyes: Normal lids and lashes, no scleral icterus   Neck: supple, normal ROM   Skin: warm and dry, not jaundiced   Cardiovascular: regular rhythm and rate, no murmurs auscultated   Pulm: clear to auscultation bilaterally, regular and unlabored   Abdomen: soft, nontender, nondistended; normal bowel sounds   Rectal: deferred   Extremities: no rash or edema   Psychiatric: Normal mood and behavior; memory intact     Results Review:     I reviewed the patient's new clinical results.    Results from last 7 days   Lab Units 01/03/20  0432 01/02/20  0839   WBC 10*3/mm3 3.99 5.48   HEMOGLOBIN g/dL 8.8* 11.0*   HEMATOCRIT % 24.7* 31.7*   PLATELETS 10*3/mm3 63* 92*     Results from last 7 days   Lab Units 01/03/20  0432 01/02/20  0839   SODIUM mmol/L 128* 126*   POTASSIUM mmol/L 4.2 3.2*   CHLORIDE mmol/L 93* 87*   CO2 mmol/L 27.9 28.5   BUN mg/dL 15 14   CREATININE mg/dL 0.86 0.87   CALCIUM mg/dL 8.1* 8.2*   BILIRUBIN mg/dL 2.9* 4.5*   ALK PHOS U/L 135* 187*   ALT (SGPT) U/L 18 28   AST (SGOT) U/L 46* 66*   GLUCOSE mg/dL 103* 90     Results from last 7 days   Lab Units 01/02/20  0839   INR  1.56*     Lab Results   Lab Value Date/Time    LIPASE 367 (H)  12/24/2019 1527    LIPASE 337 (H) 04/21/2019 1039    LIPASE 71 (H) 12/31/2018 1620    LIPASE 343 (H) 12/17/2018 1554    LIPASE 306 (H) 10/18/2018 2246    LIPASE 305 (H) 10/18/2018 1436    LIPASE 284 08/15/2018 1757       Assessment/Plan   Assessment:   1.  EtOH cirrhosis - MELDNA is 25 on admission  2.  Recurrent ascites secondary to above  3.  Hyponatremia secondary to diuretic therapy  4.  Anemia   5.  Muscle cramps    Plan:   LVP yesterday with no e/o SBP  Continue current dose diuretics, would not titrate further given his borderline Na  Continue 2GM NA diet  His Hb has dropped since admission but is now more in keeping with baseline.  He is a little hypotensive this am - will give additional dose of albumin and may need to consider midodrine  Zinc replacement has been started for his muscle cramps.  Will check magnesium levels as well.    I discussed the patients findings and my recommendations with patient.         Iraj Bell M.D.  [facility]  [address] [place] [address]  Office: [phone]              Electronically signed by Iraj Bell MD at 01/03/20 1102          Consult Notes       Junior Calderon at 01/02/20 1404      Consult Orders    1. Inpatient Access Center Consult [[id number]] ordered by Trini Key APRN at 01/02/20 1326              Access Center was consulted due to patient's long term ETOH abuse disorder.  Patient was sitting on the side of his bed.  He was pleasant.  He did not appear to be in any distress.  Introduced self to patient and his mother who was in the room. Patient stated he is not interested in an ETOH abuse evaluation as he has quit on his own in the past.  Explained role of the Access Center.  He states he has looked into some programs that includes [facility] as well as [facility].  He denied hx of DT's as well as w/ drawal.  He denies any hx of S or HI. No hx of wishing he would die.  He states  he came here b/c of his medical concerns.  He denies any concerns for his safety.      Patient's mother left the room as clinician was leaving.  She requested list of treatment resources so she would have them.  She was provided a list of treatment options.      Access Center will follow briefly as he is not wanting tx but in event he may change his mind since he just admitted today.   CIWA score was 0.  Full eval not completed as pt declined.      Electronically signed by Junior Calderon at 01/02/20 141     Iraj Bell MD at 01/02/20 1401          [facility]  Initial Inpatient Consult Note    Referring Provider: Dr Monet    Reason for Consultation: Cirrhosis, ascites    Subjective     History of present illness:    44 y.o. male unknown to our service, followed by [facility] at [facility].  He has hx of EtOH cirrhosis with recurrent ascites.  He presented to ED today c/o abdominal distention.  He has already had IR guided paracentesis with 8.4L of fluid removed.  The patient reports compliance with his diuretics at home. It appears home dosage is 40 lasix and 50 mg aldactone.      He was hospitalized at [facility] from 12/7 - 12/16 for complications related to his liver disease. He last had paracentesis on 12/24 with 9L of fluid removed.  Last drink of EtOH was Saturday.  Typically drinks at least pint of whiskey/day.  He tells me he was sober for one point for nearly 10mos.  He states he was recently seen by [facility] at [facility] and was told he might be candidate for listing.      He had EGD 10/2018 with Dr Alfredo with no e/o varices at that time.      CT A/P 12/24 with L rib fracture.  Advanced cirrhosis with ascites  R hepatic lobe hemangioma    Past Medical History:  Past Medical History:   Diagnosis Date   • Cirrhosis of liver with ascites (CMS/HCC)    • Concussion    • Hypertension      Past Surgical History:  History reviewed. No pertinent  surgical history.   Social History:   Social History     Tobacco Use   • Smoking status: Never Smoker   • Smokeless tobacco: Never Used   Substance Use Topics   • Alcohol use: Yes     Frequency: Never     Comment: 1 pint of bouban a week ago.       Family History:  History reviewed. No pertinent family history.    Home Meds:  Medications Prior to Admission   Medication Sig Dispense Refill Last Dose   • furosemide (LASIX) 40 MG tablet Take 40 mg by mouth 2 (Two) Times a Day.   1/2/2020 at am   • spironolactone (ALDACTONE) 50 MG tablet Take 50 mg by mouth Daily.   1/2/2020 at am   • folic acid (FOLVITE) 1 MG tablet Take 1 mg by mouth Daily.   2/13/2019 at Unknown time   • pantoprazole (PROTONIX) 40 MG EC tablet Take 40 mg by mouth Daily.   Patient Taking Differently at Unknown time   • thiamine (VITAMIN B-1) 100 MG tablet Take 100 mg by mouth Daily.   2/13/2019 at Unknown time     Current Meds:     albumin human 12.5 g Intravenous Once   albumin human 50 g Intravenous Once   folic acid 1 mg Oral Daily   furosemide 40 mg Oral BID   pantoprazole 40 mg Oral Daily   potassium chloride 40 mEq Oral Q4H   sodium chloride 10 mL Intravenous Q12H   spironolactone 50 mg Oral Daily   thiamine 100 mg Oral Daily     Allergies:  Allergies   Allergen Reactions   • Mushroom Anaphylaxis   • Gluten Meal Unknown (See Comments)     unknown   • Lactose Intolerance (Gi) Unknown (See Comments)     Intolerance to milk only.      Review of Systems  All systems were reviewed and negative except for:  Gastrointestinal: positive for  bloating / distention     Objective     Vital Signs  Temp:  [97.7 °F (36.5 °C)-98.1 °F (36.7 °C)] 98.1 °F (36.7 °C)  Heart Rate:  [] 86  Resp:  [16-20] 16  BP: (110-137)/() 126/89  Physical Exam:  General Appearance:    Alert, cooperative, in no acute distress   Head:    Normocephalic, without obvious abnormality, atraumatic   Eyes:            Lids and lashes normal, conjunctivae and sclerae normal, no    icterus   Throat:   No oral lesions, no thrush, oral mucosa moist   Neck:   No adenopathy, supple, trachea midline, no thyromegaly, no   carotid bruit, no JVD   Lungs:     Clear to auscultation,respirations regular, even and                   unlabored    Heart:    Regular rhythm and normal rate, normal S1 and S2, no            murmur, no gallop, no rub, no click   Chest Wall:    No abnormalities observed   Abdomen:     Soft, no obvious fluid wave, no TTP   Rectal:     Deferred   Extremities:   no edema, no cyanosis, no redness   Skin:   No bleeding, bruising or rash   Lymph nodes:   No palpable adenopathy   Psychiatric:  Judgement and insight: normal   Orientation to person place and time: normal   Mood and affect: normal   Results Review:   I reviewed the patient's new clinical results.  I reviewed the patient's new imaging results and agree with the interpretation.    Results from last 7 days   Lab Units 01/02/20  0839   WBC 10*3/mm3 5.48   HEMOGLOBIN g/dL 11.0*   HEMATOCRIT % 31.7*   PLATELETS 10*3/mm3 92*     Results from last 7 days   Lab Units 01/02/20  0839   SODIUM mmol/L 126*   POTASSIUM mmol/L 3.2*   CHLORIDE mmol/L 87*   CO2 mmol/L 28.5   BUN mg/dL 14   CREATININE mg/dL 0.87   CALCIUM mg/dL 8.2*   BILIRUBIN mg/dL 4.5*   ALK PHOS U/L 187*   ALT (SGPT) U/L 28   AST (SGOT) U/L 66*   GLUCOSE mg/dL 90     Results from last 7 days   Lab Units 01/02/20  0839   INR  1.56*     Lab Results   Lab Value Date/Time    LIPASE 367 (H) 12/24/2019 1527    LIPASE 337 (H) 04/21/2019 1039    LIPASE 71 (H) 12/31/2018 1620    LIPASE 343 (H) 12/17/2018 1554    LIPASE 306 (H) 10/18/2018 2246    LIPASE 305 (H) 10/18/2018 1436    LIPASE 284 08/15/2018 1757       Radiology:  US Paracentesis   Final Result   Successful diagnostic and therapeutic ultrasound guided   paracentesis.       This report was finalized on 1/2/2020 12:56 PM by Dr. Bijan Rasmussen MD.              Assessment/Plan   Assessment:   1.  EtOH cirrhosis - MELDNA  is 25  2.  Recurrent ascites secondary to above  3.  Hyponatremia secondary to diuretic therapy    Plan:   Await fluid studies from today's LVP  I have ordered albumin replacement - 50gms (initial orders was only for 12.5gm)  Continue current dose diuretics and monitor Na - he is not far off from his baseline in this regard  2gm NA diet  Add zinc sulfate  BPs are ok, no indication for midodrine at this time    Prognosis is very guarded in setting of ongoing EtOH use.      He will need to f/u with [facility] upon discharge and he will need to update [facility] team about current events.  Unfortunately his MELD is probably too high to consider TIPS, and will therefore need to have regular paracentesis to keep him out of the hospital.  His recent EtOH use has probably disqualified him from transplant listing for the time being.       I discussed the patients findings and my recommendations with patient.         Iraj Bell M.D.  [facility]  [address] [place] [address]  Office: [phone]      Electronically signed by Iraj Bell MD at 01/02/20 0198

## 2020-01-03 NOTE — PROGRESS NOTES
"[facility] did follow up with pt. Pt states he has the outpt resources he needs and did not have questions about the. Pt states he is having a \"rough\" day with pain and was agreeable to have [facility] follow.  "
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
## 2020-01-04 VITALS
OXYGEN SATURATION: 98 % | SYSTOLIC BLOOD PRESSURE: 102 MMHG | BODY MASS INDEX: 21.5 KG/M2 | HEIGHT: 66 IN | DIASTOLIC BLOOD PRESSURE: 64 MMHG | HEART RATE: 64 BPM | TEMPERATURE: 97.8 F | RESPIRATION RATE: 18 BRPM | WEIGHT: 133.8 LBS

## 2020-01-04 LAB
ALBUMIN SERPL-MCNC: 2.6 G/DL (ref 3.5–5.2)
ALBUMIN/GLOB SERPL: 0.7 G/DL
ALP SERPL-CCNC: 148 U/L (ref 39–117)
ALT SERPL W P-5'-P-CCNC: 20 U/L (ref 1–41)
ANION GAP SERPL CALCULATED.3IONS-SCNC: 8.9 MMOL/L (ref 5–15)
AST SERPL-CCNC: 45 U/L (ref 1–40)
BASOPHILS # BLD AUTO: 0.03 10*3/MM3 (ref 0–0.2)
BASOPHILS NFR BLD AUTO: 0.8 % (ref 0–1.5)
BILIRUB SERPL-MCNC: 2.1 MG/DL (ref 0.2–1.2)
BUN BLD-MCNC: 13 MG/DL (ref 6–20)
BUN/CREAT SERPL: 16 (ref 7–25)
CALCIUM SPEC-SCNC: 8.1 MG/DL (ref 8.6–10.5)
CHLORIDE SERPL-SCNC: 94 MMOL/L (ref 98–107)
CO2 SERPL-SCNC: 27.1 MMOL/L (ref 22–29)
CREAT BLD-MCNC: 0.81 MG/DL (ref 0.76–1.27)
DEPRECATED RDW RBC AUTO: 62.3 FL (ref 37–54)
EOSINOPHIL # BLD AUTO: 0.05 10*3/MM3 (ref 0–0.4)
EOSINOPHIL NFR BLD AUTO: 1.3 % (ref 0.3–6.2)
ERYTHROCYTE [DISTWIDTH] IN BLOOD BY AUTOMATED COUNT: 18.3 % (ref 12.3–15.4)
GFR SERPL CREATININE-BSD FRML MDRD: 103 ML/MIN/1.73
GFR SERPL CREATININE-BSD FRML MDRD: 124 ML/MIN/1.73
GLOBULIN UR ELPH-MCNC: 3.5 GM/DL
GLUCOSE BLD-MCNC: 110 MG/DL (ref 65–99)
HCT VFR BLD AUTO: 27.8 % (ref 37.5–51)
HGB BLD-MCNC: 9.5 G/DL (ref 13–17.7)
LYMPHOCYTES # BLD AUTO: 0.79 10*3/MM3 (ref 0.7–3.1)
LYMPHOCYTES NFR BLD AUTO: 20.1 % (ref 19.6–45.3)
MCH RBC QN AUTO: 31.8 PG (ref 26.6–33)
MCHC RBC AUTO-ENTMCNC: 34.2 G/DL (ref 31.5–35.7)
MCV RBC AUTO: 93 FL (ref 79–97)
MONOCYTES # BLD AUTO: 0.55 10*3/MM3 (ref 0.1–0.9)
MONOCYTES NFR BLD AUTO: 14 % (ref 5–12)
NEUTROPHILS # BLD AUTO: 2.52 10*3/MM3 (ref 1.7–7)
NEUTROPHILS NFR BLD AUTO: 63.8 % (ref 42.7–76)
PLATELET # BLD AUTO: 74 10*3/MM3 (ref 140–450)
PMV BLD AUTO: 9.7 FL (ref 6–12)
POTASSIUM BLD-SCNC: 4.5 MMOL/L (ref 3.5–5.2)
PROT SERPL-MCNC: 6.1 G/DL (ref 6–8.5)
RBC # BLD AUTO: 2.99 10*6/MM3 (ref 4.14–5.8)
SODIUM BLD-SCNC: 130 MMOL/L (ref 136–145)
WBC NRBC COR # BLD: 3.94 10*3/MM3 (ref 3.4–10.8)

## 2020-01-04 PROCEDURE — 80053 COMPREHEN METABOLIC PANEL: CPT | Performed by: INTERNAL MEDICINE

## 2020-01-04 PROCEDURE — 99232 SBSQ HOSP IP/OBS MODERATE 35: CPT | Performed by: INTERNAL MEDICINE

## 2020-01-04 PROCEDURE — 85025 COMPLETE CBC W/AUTO DIFF WBC: CPT | Performed by: INTERNAL MEDICINE

## 2020-01-04 RX ORDER — MIDODRINE HYDROCHLORIDE 5 MG/1
10 TABLET ORAL
Status: DISCONTINUED | OUTPATIENT
Start: 2020-01-04 | End: 2020-01-05 | Stop reason: HOSPADM

## 2020-01-04 RX ORDER — MIDODRINE HYDROCHLORIDE 5 MG/1
10 TABLET ORAL
Status: DISCONTINUED | OUTPATIENT
Start: 2020-01-05 | End: 2020-01-04

## 2020-01-04 RX ADMIN — FUROSEMIDE 40 MG: 40 TABLET ORAL at 20:12

## 2020-01-04 RX ADMIN — FOLIC ACID 1 MG: 1 TABLET ORAL at 09:20

## 2020-01-04 RX ADMIN — Medication 220 MG: at 09:19

## 2020-01-04 RX ADMIN — LIDOCAINE 2 PATCH: 50 PATCH CUTANEOUS at 09:19

## 2020-01-04 RX ADMIN — PANTOPRAZOLE SODIUM 40 MG: 40 TABLET, DELAYED RELEASE ORAL at 10:41

## 2020-01-04 RX ADMIN — SPIRONOLACTONE 50 MG: 50 TABLET, FILM COATED ORAL at 09:19

## 2020-01-04 RX ADMIN — MIDODRINE HYDROCHLORIDE 5 MG: 5 TABLET ORAL at 11:24

## 2020-01-04 RX ADMIN — Medication 100 MG: at 09:20

## 2020-01-04 RX ADMIN — MIDODRINE HYDROCHLORIDE 5 MG: 5 TABLET ORAL at 06:35

## 2020-01-04 RX ADMIN — FUROSEMIDE 40 MG: 40 TABLET ORAL at 09:20

## 2020-01-04 RX ADMIN — MIDODRINE HYDROCHLORIDE 10 MG: 5 TABLET ORAL at 17:47

## 2020-01-04 RX ADMIN — SODIUM CHLORIDE, PRESERVATIVE FREE 10 ML: 5 INJECTION INTRAVENOUS at 20:12
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
## 2020-01-04 NOTE — PLAN OF CARE
Patient alert and oriented x 4 this shift. Patient ambulating in room today.patient having low bp today. Md aware and increased medication. Patient on 1500 ml fluid restriction. Anticipate discharge tmrw. Continue to monitor.

## 2020-01-04 NOTE — PROGRESS NOTES
[facility]     Inpatient Follow Up Note    Patient Identification:  Name: Aramis Baez  Age: 44 y.o.  Sex: male  :  1975  MRN: [id number]    Information from:patient     CC: ESLD    History:   Patient feels fairly well today except for profuse intermittent generalized muscular cramping which he says he is been having for at least a year.  Really no other particular complaints at this time.    Review of Systems:  Constitutional:  Negative   Cardiovascular:  Negative   Respiratory:  Negative             Problem List:  Patient Active Problem List    Diagnosis   • Alcoholic cirrhosis of liver with ascites (CMS/HCC) [K70.31]   • Alcoholism /alcohol abuse (CMS/HCC) [F10.20]   • Hypotension [I95.9]   • Thrombocytopenia (CMS/HCC) [D69.6]   • Hyponatremia [E87.1]   • Anemia [D64.9]   • Hypokalemia [E87.6]   • Decompensated hepatic cirrhosis (CMS/HCC) [K72.90]   • Alcohol induced fatty liver [K70.0]     Current Meds:  MAR Reviewed  Scheduled Meds:  folic acid 1 mg Oral Daily   furosemide 40 mg Oral BID   lidocaine 2 patch Transdermal Q24H   midodrine 5 mg Oral TID AC   pantoprazole 40 mg Oral Daily   sodium chloride 10 mL Intravenous Q12H   spironolactone 50 mg Oral Daily   thiamine 100 mg Oral Daily   zinc sulfate 220 mg Oral Daily     Continuous Infusions:   PRN Meds:.•  codeine  •  nitroglycerin  •  ondansetron  •  [COMPLETED] Insert peripheral IV **AND** sodium chloride  •  sodium chloride  •  tiZANidine  Allergies:  Allergies   Allergen Reactions   • Mushroom Anaphylaxis   • Gluten Meal Unknown (See Comments)     unknown   • Lactose Intolerance (Gi) Unknown (See Comments)     Intolerance to milk only.        Intake/Output:     Intake/Output Summary (Last 24 hours) at 2020 1608  Last data filed at 2020 0724  Gross per 24 hour   Intake --   Output 800 ml   Net -800 ml     New allergies/reactions:  None    Physical Exam:  Vitals:   Temp (24hrs), Av °F (36.7 °C), Min:97.7 °F  "(36.5 °C), Max:98.6 °F (37 °C)    Temp:  [97.7 °F (36.5 °C)-98.6 °F (37 °C)] 97.8 °F (36.6 °C)  Heart Rate:  [64] 64  Resp:  [18] 18  BP: ()/(48-96) 97/96  BP 97/96 (BP Location: Left arm, Patient Position: Sitting)   Pulse 64   Temp 97.8 °F (36.6 °C) (Oral)   Resp 18   Ht 167.6 cm (65.98\")   Wt 60.7 kg (133 lb 12.8 oz)   SpO2 98%   BMI 21.61 kg/m²     Exam:  NAD  PERRLA. Sclerae and conjunctivae normal  HENT: external inspection normal. Hearing intact.  No respiratory distress.  Alert, oriented, normal affect.         DATA:  Radiology and Labs:   Recent Results (from the past 24 hour(s))   Comprehensive Metabolic Panel    Collection Time: 01/04/20 10:58 AM   Result Value Ref Range    Glucose 110 (H) 65 - 99 mg/dL    BUN 13 6 - 20 mg/dL    Creatinine 0.81 0.76 - 1.27 mg/dL    Sodium 130 (L) 136 - 145 mmol/L    Potassium 4.5 3.5 - 5.2 mmol/L    Chloride 94 (L) 98 - 107 mmol/L    CO2 27.1 22.0 - 29.0 mmol/L    Calcium 8.1 (L) 8.6 - 10.5 mg/dL    Total Protein 6.1 6.0 - 8.5 g/dL    Albumin 2.60 (L) 3.50 - 5.20 g/dL    ALT (SGPT) 20 1 - 41 U/L    AST (SGOT) 45 (H) 1 - 40 U/L    Alkaline Phosphatase 148 (H) 39 - 117 U/L    Total Bilirubin 2.1 (H) 0.2 - 1.2 mg/dL    eGFR Non African Amer 103 >60 mL/min/1.73    eGFR  African Amer 124 >60 mL/min/1.73    Globulin 3.5 gm/dL    A/G Ratio 0.7 g/dL    BUN/Creatinine Ratio 16.0 7.0 - 25.0    Anion Gap 8.9 5.0 - 15.0 mmol/L   CBC Auto Differential    Collection Time: 01/04/20 10:58 AM   Result Value Ref Range    WBC 3.94 3.40 - 10.80 10*3/mm3    RBC 2.99 (L) 4.14 - 5.80 10*6/mm3    Hemoglobin 9.5 (L) 13.0 - 17.7 g/dL    Hematocrit 27.8 (L) 37.5 - 51.0 %    MCV 93.0 79.0 - 97.0 fL    MCH 31.8 26.6 - 33.0 pg    MCHC 34.2 31.5 - 35.7 g/dL    RDW 18.3 (H) 12.3 - 15.4 %    RDW-SD 62.3 (H) 37.0 - 54.0 fl    MPV 9.7 6.0 - 12.0 fL    Platelets 74 (L) 140 - 450 10*3/mm3    Neutrophil % 63.8 42.7 - 76.0 %    Lymphocyte % 20.1 19.6 - 45.3 %    Monocyte % 14.0 (H) 5.0 - 12.0 %    " "Eosinophil % 1.3 0.3 - 6.2 %    Basophil % 0.8 0.0 - 1.5 %    Neutrophils, Absolute 2.52 1.70 - 7.00 10*3/mm3    Lymphocytes, Absolute 0.79 0.70 - 3.10 10*3/mm3    Monocytes, Absolute 0.55 0.10 - 0.90 10*3/mm3    Eosinophils, Absolute 0.05 0.00 - 0.40 10*3/mm3    Basophils, Absolute 0.03 0.00 - 0.20 10*3/mm3       Assessment:   Problem List:     Alcohol induced fatty liver    Alcoholic cirrhosis of liver with ascites (CMS/HCC)    Alcoholism /alcohol abuse (CMS/HCC)    Hypotension    Thrombocytopenia (CMS/HCC)    Hyponatremia    Anemia    Hypokalemia    Decompensated hepatic cirrhosis (CMS/HCC)    His biggest complaint right now is the diffuse muscle spasm.  His magnesium has been checked and is normal.  His calcium is slightly low, appropriate for his degree of hypoalbuminemia.  Everything else as per above appears to be status quo.    Plan:   Continue current management and careful monitoring.   Suggested he try \"Hot Shots\" which can be effective for muscle cramping related to overexertion and dehydration.       Balaji Gibson MD  [facility]  1/4/2020      "

## 2020-01-05 PROBLEM — E87.6 HYPOKALEMIA: Status: RESOLVED | Noted: 2020-01-02 | Resolved: 2020-01-05

## 2020-01-05 LAB
ALBUMIN SERPL-MCNC: 2.6 G/DL (ref 3.5–5.2)
ALBUMIN/GLOB SERPL: 0.7 G/DL
ALP SERPL-CCNC: 155 U/L (ref 39–117)
ALT SERPL W P-5'-P-CCNC: 19 U/L (ref 1–41)
ANION GAP SERPL CALCULATED.3IONS-SCNC: 9.6 MMOL/L (ref 5–15)
AST SERPL-CCNC: 47 U/L (ref 1–40)
BASOPHILS # BLD AUTO: 0.04 10*3/MM3 (ref 0–0.2)
BASOPHILS NFR BLD AUTO: 0.9 % (ref 0–1.5)
BILIRUB SERPL-MCNC: 2.3 MG/DL (ref 0.2–1.2)
BUN BLD-MCNC: 12 MG/DL (ref 6–20)
BUN/CREAT SERPL: 15.8 (ref 7–25)
CALCIUM SPEC-SCNC: 7.9 MG/DL (ref 8.6–10.5)
CHLORIDE SERPL-SCNC: 97 MMOL/L (ref 98–107)
CO2 SERPL-SCNC: 25.4 MMOL/L (ref 22–29)
CREAT BLD-MCNC: 0.76 MG/DL (ref 0.76–1.27)
DEPRECATED RDW RBC AUTO: 62 FL (ref 37–54)
EOSINOPHIL # BLD AUTO: 0.06 10*3/MM3 (ref 0–0.4)
EOSINOPHIL NFR BLD AUTO: 1.3 % (ref 0.3–6.2)
ERYTHROCYTE [DISTWIDTH] IN BLOOD BY AUTOMATED COUNT: 18.4 % (ref 12.3–15.4)
GFR SERPL CREATININE-BSD FRML MDRD: 110 ML/MIN/1.73
GFR SERPL CREATININE-BSD FRML MDRD: 134 ML/MIN/1.73
GLOBULIN UR ELPH-MCNC: 3.8 GM/DL
GLUCOSE BLD-MCNC: 97 MG/DL (ref 65–99)
HCT VFR BLD AUTO: 28.4 % (ref 37.5–51)
HGB BLD-MCNC: 9.7 G/DL (ref 13–17.7)
LYMPHOCYTES # BLD AUTO: 1.09 10*3/MM3 (ref 0.7–3.1)
LYMPHOCYTES NFR BLD AUTO: 23.3 % (ref 19.6–45.3)
MCH RBC QN AUTO: 32 PG (ref 26.6–33)
MCHC RBC AUTO-ENTMCNC: 34.2 G/DL (ref 31.5–35.7)
MCV RBC AUTO: 93.7 FL (ref 79–97)
MONOCYTES # BLD AUTO: 0.65 10*3/MM3 (ref 0.1–0.9)
MONOCYTES NFR BLD AUTO: 13.9 % (ref 5–12)
NEUTROPHILS # BLD AUTO: 2.83 10*3/MM3 (ref 1.7–7)
NEUTROPHILS NFR BLD AUTO: 60.4 % (ref 42.7–76)
PLATELET # BLD AUTO: 75 10*3/MM3 (ref 140–450)
PMV BLD AUTO: 9.9 FL (ref 6–12)
POTASSIUM BLD-SCNC: 3.7 MMOL/L (ref 3.5–5.2)
PROT SERPL-MCNC: 6.4 G/DL (ref 6–8.5)
RBC # BLD AUTO: 3.03 10*6/MM3 (ref 4.14–5.8)
SODIUM BLD-SCNC: 132 MMOL/L (ref 136–145)
WBC NRBC COR # BLD: 4.68 10*3/MM3 (ref 3.4–10.8)

## 2020-01-05 PROCEDURE — 85025 COMPLETE CBC W/AUTO DIFF WBC: CPT | Performed by: INTERNAL MEDICINE

## 2020-01-05 PROCEDURE — 80053 COMPREHEN METABOLIC PANEL: CPT | Performed by: INTERNAL MEDICINE

## 2020-01-05 RX ORDER — ZINC SULFATE 50(220)MG
220 CAPSULE ORAL DAILY
Qty: 30 CAPSULE | Refills: 0 | Status: SHIPPED | OUTPATIENT
Start: 2020-01-06

## 2020-01-05 RX ORDER — TIZANIDINE 2 MG/1
2 TABLET ORAL EVERY 12 HOURS PRN
Qty: 60 TABLET | Refills: 0 | Status: SHIPPED | OUTPATIENT
Start: 2020-01-05

## 2020-01-05 RX ORDER — MIDODRINE HYDROCHLORIDE 10 MG/1
10 TABLET ORAL
Qty: 90 TABLET | Refills: 0 | Status: SHIPPED | OUTPATIENT
Start: 2020-01-05

## 2020-01-05 RX ORDER — LIDOCAINE 50 MG/G
2 PATCH TOPICAL
Qty: 30 EACH | Refills: 0 | Status: SHIPPED | OUTPATIENT
Start: 2020-01-06

## 2020-01-05 RX ADMIN — FUROSEMIDE 40 MG: 40 TABLET ORAL at 08:24

## 2020-01-05 RX ADMIN — SODIUM CHLORIDE, PRESERVATIVE FREE 10 ML: 5 INJECTION INTRAVENOUS at 10:11

## 2020-01-05 RX ADMIN — Medication 220 MG: at 08:24

## 2020-01-05 RX ADMIN — PANTOPRAZOLE SODIUM 40 MG: 40 TABLET, DELAYED RELEASE ORAL at 08:24

## 2020-01-05 RX ADMIN — LIDOCAINE 1 PATCH: 50 PATCH CUTANEOUS at 08:25

## 2020-01-05 RX ADMIN — Medication 100 MG: at 08:24

## 2020-01-05 RX ADMIN — FOLIC ACID 1 MG: 1 TABLET ORAL at 08:24

## 2020-01-05 RX ADMIN — MIDODRINE HYDROCHLORIDE 10 MG: 5 TABLET ORAL at 08:24

## 2020-01-05 RX ADMIN — SPIRONOLACTONE 50 MG: 50 TABLET, FILM COATED ORAL at 08:24

## 2020-01-05 NOTE — PLAN OF CARE
Problem: Patient Care Overview  Goal: Plan of Care Review  Outcome: Ongoing (interventions implemented as appropriate)  Flowsheets (Taken 1/5/2020 0423)  Plan of Care Reviewed With: patient  Outcome Summary: pt denies c/o. VSS stable.  Goal: Individualization and Mutuality  Outcome: Ongoing (interventions implemented as appropriate)  Goal: Discharge Needs Assessment  Outcome: Ongoing (interventions implemented as appropriate)  Goal: Interprofessional Rounds/Family Conf  Outcome: Ongoing (interventions implemented as appropriate)  Flowsheets (Taken 1/5/2020 0432)  Participants: nursing     Problem: Fall Risk (Adult)  Goal: Absence of Fall  Outcome: Ongoing (interventions implemented as appropriate)  Flowsheets (Taken 1/5/2020 0432)  Absence of Fall: making progress toward outcome     Problem: Skin Injury Risk (Adult)  Goal: Skin Health and Integrity  Outcome: Ongoing (interventions implemented as appropriate)  Flowsheets (Taken 1/5/2020 0432)  Skin Health and Integrity: making progress toward outcome
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
## 2020-01-06 ENCOUNTER — READMISSION MANAGEMENT (OUTPATIENT)
Dept: CALL CENTER | Facility: HOSPITAL | Age: 45
End: 2020-01-06

## 2020-01-06 NOTE — OUTREACH NOTE
Prep Survey      Responses   Facility patient discharged from?  [facility]   Is patient eligible?  No   What are the reasons patient is not eligible?  Other   Does the patient have one of the following disease processes/diagnoses(primary or secondary)?  Other   Prep survey completed?  Yes          Carisa Hayes RN

## 2020-01-07 LAB
BACTERIA FLD CULT: NORMAL
GRAM STN SPEC: NORMAL
GRAM STN SPEC: NORMAL

## 2020-01-07 NOTE — PROGRESS NOTES
Case Management Discharge Note      Final Note: home to self care         Destination      No service has been selected for the patient.      Durable Medical Equipment      No service has been selected for the patient.      Dialysis/Infusion      No service has been selected for the patient.      Home Medical Care      No service has been selected for the patient.      Therapy      No service has been selected for the patient.      Community Resources      No service has been selected for the patient.        Transportation Services  Private: Car    Final Discharge Disposition Code: 01 - home or self-care

## 2020-01-17 ENCOUNTER — HOSPITAL ENCOUNTER (EMERGENCY)
Facility: HOSPITAL | Age: 45
Discharge: HOME | End: 2020-01-17
Attending: EMERGENCY MEDICINE | Admitting: EMERGENCY MEDICINE

## 2020-01-17 VITALS
RESPIRATION RATE: 16 BRPM | OXYGEN SATURATION: 98 % | BODY MASS INDEX: 23.83 KG/M2 | DIASTOLIC BLOOD PRESSURE: 75 MMHG | HEART RATE: 84 BPM | SYSTOLIC BLOOD PRESSURE: 102 MMHG | HEIGHT: 66 IN | WEIGHT: 148.3 LBS | TEMPERATURE: 97.7 F

## 2020-01-17 DIAGNOSIS — N17.9 ACUTE NONTRAUMATIC KIDNEY INJURY (HCC): Primary | ICD-10-CM

## 2020-01-17 DIAGNOSIS — K70.31 ALCOHOLIC CIRRHOSIS OF LIVER WITH ASCITES (HCC): ICD-10-CM

## 2020-01-17 LAB
ALBUMIN SERPL-MCNC: 3 G/DL (ref 3.5–5.2)
ALBUMIN/GLOB SERPL: 0.7 G/DL
ALP SERPL-CCNC: 139 U/L (ref 39–117)
ALT SERPL W P-5'-P-CCNC: 18 U/L (ref 1–41)
AMMONIA BLD-SCNC: 33 UMOL/L (ref 16–60)
ANION GAP SERPL CALCULATED.3IONS-SCNC: 13 MMOL/L (ref 5–15)
AST SERPL-CCNC: 39 U/L (ref 1–40)
BACTERIA UR QL AUTO: ABNORMAL /HPF
BASOPHILS # BLD AUTO: 0.03 10*3/MM3 (ref 0–0.2)
BASOPHILS NFR BLD AUTO: 0.5 % (ref 0–1.5)
BILIRUB SERPL-MCNC: 2.6 MG/DL (ref 0.2–1.2)
BILIRUB UR QL STRIP: NEGATIVE
BUN BLD-MCNC: 35 MG/DL (ref 6–20)
BUN/CREAT SERPL: 14.9 (ref 7–25)
CALCIUM SPEC-SCNC: 8.5 MG/DL (ref 8.6–10.5)
CHLORIDE SERPL-SCNC: 98 MMOL/L (ref 98–107)
CLARITY UR: CLEAR
CO2 SERPL-SCNC: 22 MMOL/L (ref 22–29)
COLOR UR: ABNORMAL
CREAT BLD-MCNC: 2.35 MG/DL (ref 0.76–1.27)
DEPRECATED RDW RBC AUTO: 62.8 FL (ref 37–54)
EOSINOPHIL # BLD AUTO: 0.13 10*3/MM3 (ref 0–0.4)
EOSINOPHIL NFR BLD AUTO: 2.2 % (ref 0.3–6.2)
ERYTHROCYTE [DISTWIDTH] IN BLOOD BY AUTOMATED COUNT: 18.4 % (ref 12.3–15.4)
ETHANOL BLD-MCNC: <10 MG/DL (ref 0–10)
ETHANOL UR QL: <0.01 %
GFR SERPL CREATININE-BSD FRML MDRD: 30 ML/MIN/1.73
GFR SERPL CREATININE-BSD FRML MDRD: 36 ML/MIN/1.73
GLOBULIN UR ELPH-MCNC: 4.6 GM/DL
GLUCOSE BLD-MCNC: 111 MG/DL (ref 65–99)
GLUCOSE UR STRIP-MCNC: NEGATIVE MG/DL
HCT VFR BLD AUTO: 31.1 % (ref 37.5–51)
HGB BLD-MCNC: 10.8 G/DL (ref 13–17.7)
HGB UR QL STRIP.AUTO: ABNORMAL
HOLD SPECIMEN: NORMAL
HOLD SPECIMEN: NORMAL
HYALINE CASTS UR QL AUTO: ABNORMAL /LPF
IMM GRANULOCYTES # BLD AUTO: 0.01 10*3/MM3 (ref 0–0.05)
IMM GRANULOCYTES NFR BLD AUTO: 0.2 % (ref 0–0.5)
KETONES UR QL STRIP: ABNORMAL
LEUKOCYTE ESTERASE UR QL STRIP.AUTO: ABNORMAL
LIPASE SERPL-CCNC: 241 U/L (ref 13–60)
LYMPHOCYTES # BLD AUTO: 1.12 10*3/MM3 (ref 0.7–3.1)
LYMPHOCYTES NFR BLD AUTO: 18.8 % (ref 19.6–45.3)
MCH RBC QN AUTO: 32.2 PG (ref 26.6–33)
MCHC RBC AUTO-ENTMCNC: 34.7 G/DL (ref 31.5–35.7)
MCV RBC AUTO: 92.8 FL (ref 79–97)
MONOCYTES # BLD AUTO: 0.42 10*3/MM3 (ref 0.1–0.9)
MONOCYTES NFR BLD AUTO: 7.1 % (ref 5–12)
NEUTROPHILS # BLD AUTO: 4.24 10*3/MM3 (ref 1.7–7)
NEUTROPHILS NFR BLD AUTO: 71.2 % (ref 42.7–76)
NITRITE UR QL STRIP: NEGATIVE
NRBC BLD AUTO-RTO: 0 /100 WBC (ref 0–0.2)
PH UR STRIP.AUTO: 5.5 [PH] (ref 5–8)
PLATELET # BLD AUTO: 94 10*3/MM3 (ref 140–450)
PMV BLD AUTO: 10.7 FL (ref 6–12)
POTASSIUM BLD-SCNC: 5.4 MMOL/L (ref 3.5–5.2)
PROT SERPL-MCNC: 7.6 G/DL (ref 6–8.5)
PROT UR QL STRIP: NEGATIVE
RBC # BLD AUTO: 3.35 10*6/MM3 (ref 4.14–5.8)
RBC # UR: ABNORMAL /HPF
REF LAB TEST METHOD: ABNORMAL
SODIUM BLD-SCNC: 133 MMOL/L (ref 136–145)
SP GR UR STRIP: 1.03 (ref 1–1.03)
SQUAMOUS #/AREA URNS HPF: ABNORMAL /HPF
UROBILINOGEN UR QL STRIP: ABNORMAL
WBC NRBC COR # BLD: 5.95 10*3/MM3 (ref 3.4–10.8)
WBC UR QL AUTO: ABNORMAL /HPF
WHOLE BLOOD HOLD SPECIMEN: NORMAL
WHOLE BLOOD HOLD SPECIMEN: NORMAL

## 2020-01-17 PROCEDURE — 81001 URINALYSIS AUTO W/SCOPE: CPT | Performed by: NURSE PRACTITIONER

## 2020-01-17 PROCEDURE — 82140 ASSAY OF AMMONIA: CPT | Performed by: NURSE PRACTITIONER

## 2020-01-17 PROCEDURE — 85025 COMPLETE CBC W/AUTO DIFF WBC: CPT | Performed by: NURSE PRACTITIONER

## 2020-01-17 PROCEDURE — 93005 ELECTROCARDIOGRAM TRACING: CPT | Performed by: NURSE PRACTITIONER

## 2020-01-17 PROCEDURE — 80307 DRUG TEST PRSMV CHEM ANLYZR: CPT | Performed by: NURSE PRACTITIONER

## 2020-01-17 PROCEDURE — 99283 EMERGENCY DEPT VISIT LOW MDM: CPT

## 2020-01-17 PROCEDURE — 80053 COMPREHEN METABOLIC PANEL: CPT | Performed by: NURSE PRACTITIONER

## 2020-01-17 PROCEDURE — 93010 ELECTROCARDIOGRAM REPORT: CPT | Performed by: INTERNAL MEDICINE

## 2020-01-17 PROCEDURE — 83690 ASSAY OF LIPASE: CPT | Performed by: NURSE PRACTITIONER

## 2020-01-17 RX ORDER — SODIUM CHLORIDE 0.9 % (FLUSH) 0.9 %
10 SYRINGE (ML) INJECTION AS NEEDED
Status: DISCONTINUED | OUTPATIENT
Start: 2020-01-17 | End: 2020-01-17 | Stop reason: HOSPADM

## 2020-01-17 RX ORDER — HYDROXYZINE HYDROCHLORIDE 25 MG/1
25-50 TABLET, FILM COATED ORAL
COMMUNITY
Start: 2019-07-15

## 2020-01-17 RX ORDER — MIRTAZAPINE 15 MG/1
7.5 TABLET, FILM COATED ORAL NIGHTLY
COMMUNITY

## 2020-01-17 RX ORDER — ATENOLOL 25 MG/1
25 TABLET ORAL DAILY
COMMUNITY

## 2020-01-17 NOTE — ED NOTES
Patient to er, was instructed by pcp to come to Er related to abnormal labs. Patient reported his kidney function was elevated. Stated had blood work two day ago.      Camden Mcgill RN  01/17/20 0193

## 2020-01-17 NOTE — ED PROVIDER NOTES
EMERGENCY DEPARTMENT ENCOUNTER    Room Number:  22/22  Date of encounter:  1/17/2020  PCP: Chantel Kaminski MD  Historian: Patient   Full history not obtainable due to: none     HPI:  Chief Complaint: Fatigue , Abnormal lab     Context: Aramis Baez is a 44 y.o. male with a hx of alcoholic cirrohsis who presents to the ED c/o fatigue onset several days ago. Fatigue is constant and progressively getting worse somewhat in part, he believes due to lack of sleep. Nothing improves or worsens the symptoms.  Associated weakness, dysuria, decreased urinary output and dark colored urine. Reports nausea without vomiting, some diarrhea. No abdominal pain. He had labs drawn at his GI doctor who has been following him for cirrhosis and abnormal labs drawn on Wednesday by his GI. His ascites was worsening last week and he admits that he took more lasix over the weekend than prescribed due to the swelling. No fever. No abdominal pain.   Last ETOH he believes was around a month ago.    MEDICAL RECORD REVIEW: Cr drawn 1/15/20 resulted 3.0, last drawn on 1/5/20 and was normal at 0.76.       PAST MEDICAL HISTORY    Active Ambulatory Problems     Diagnosis Date Noted   • Alcohol induced fatty liver 02/11/2019   • Alcoholic cirrhosis of liver with ascites (CMS/HCC) 01/02/2020   • Alcoholism /alcohol abuse (CMS/HCC) 01/02/2020   • Hypotension 01/02/2020   • Thrombocytopenia (CMS/HCC) 01/02/2020   • Hyponatremia 01/02/2020   • Anemia 01/02/2020   • Decompensated hepatic cirrhosis (CMS/HCC) 01/02/2020     Resolved Ambulatory Problems     Diagnosis Date Noted   • Hypokalemia 01/02/2020     Past Medical History:   Diagnosis Date   • Cirrhosis of liver with ascites (CMS/HCC)    • Concussion    • Hypertension          PAST SURGICAL HISTORY  History reviewed. No pertinent surgical history.      FAMILY HISTORY  History reviewed. No pertinent family history.      SOCIAL HISTORY  Social History     Socioeconomic History   • Marital status:  Single     Spouse name: Not on file   • Number of children: Not on file   • Years of education: Not on file   • Highest education level: Not on file   Tobacco Use   • Smoking status: Never Smoker   • Smokeless tobacco: Never Used   Substance and Sexual Activity   • Alcohol use: Yes     Frequency: Never     Comment: 1 pint of bouban a week ago.    • Drug use: Yes     Types: Marijuana   • Sexual activity: Yes         ALLERGIES  Mushroom; Gluten meal; and Lactose intolerance (gi)        REVIEW OF SYSTEMS  Review of Systems   All systems reviewed and marked as negative except as listed in HPI       PHYSICAL EXAM    I have reviewed the triage vital signs and nursing notes.    ED Triage Vitals [01/17/20 1534]   Temp Heart Rate Resp BP SpO2   97.7 °F (36.5 °C) 89 16 -- 100 %      Temp src Heart Rate Source Patient Position BP Location FiO2 (%)   Tympanic -- -- -- --       GENERAL: Alert well developed, well nourished in no distress  HENT: NCAT, neck supple, trachea midline, dry mm.  EYES: +scleral icterus, PERRL, normal conjunctiva  CV: regular rhythm, regular rate, no murmur  RESPIRATORY: unlabored effort, CTAB  ABDOMEN: soft, +fluid wave, mild distension, non-tender, bowel sounds present  MUSCULOSKELETAL: no gross deformity  NEURO: alert,  sensory and motor function of extremities grossly intact, speech clear, mental status normal/baseline  SKIN: warm, dry, no rash  PSYCH:  Appropriate mood and affect    Vital signs and nursing notes reviewed.          LAB RESULTS  Recent Results (from the past 24 hour(s))   Comprehensive Metabolic Panel    Collection Time: 01/17/20  4:01 PM   Result Value Ref Range    Glucose 111 (H) 65 - 99 mg/dL    BUN 35 (H) 6 - 20 mg/dL    Creatinine 2.35 (H) 0.76 - 1.27 mg/dL    Sodium 133 (L) 136 - 145 mmol/L    Potassium 5.4 (H) 3.5 - 5.2 mmol/L    Chloride 98 98 - 107 mmol/L    CO2 22.0 22.0 - 29.0 mmol/L    Calcium 8.5 (L) 8.6 - 10.5 mg/dL    Total Protein 7.6 6.0 - 8.5 g/dL    Albumin 3.00 (L)  3.50 - 5.20 g/dL    ALT (SGPT) 18 1 - 41 U/L    AST (SGOT) 39 1 - 40 U/L    Alkaline Phosphatase 139 (H) 39 - 117 U/L    Total Bilirubin 2.6 (H) 0.2 - 1.2 mg/dL    eGFR Non African Amer 30 (L) >60 mL/min/1.73    eGFR  African Amer 36 (L) >60 mL/min/1.73    Globulin 4.6 gm/dL    A/G Ratio 0.7 g/dL    BUN/Creatinine Ratio 14.9 7.0 - 25.0    Anion Gap 13.0 5.0 - 15.0 mmol/L   Lipase    Collection Time: 01/17/20  4:01 PM   Result Value Ref Range    Lipase 241 (H) 13 - 60 U/L   CBC Auto Differential    Collection Time: 01/17/20  4:01 PM   Result Value Ref Range    WBC 5.95 3.40 - 10.80 10*3/mm3    RBC 3.35 (L) 4.14 - 5.80 10*6/mm3    Hemoglobin 10.8 (L) 13.0 - 17.7 g/dL    Hematocrit 31.1 (L) 37.5 - 51.0 %    MCV 92.8 79.0 - 97.0 fL    MCH 32.2 26.6 - 33.0 pg    MCHC 34.7 31.5 - 35.7 g/dL    RDW 18.4 (H) 12.3 - 15.4 %    RDW-SD 62.8 (H) 37.0 - 54.0 fl    MPV 10.7 6.0 - 12.0 fL    Platelets 94 (L) 140 - 450 10*3/mm3    Neutrophil % 71.2 42.7 - 76.0 %    Lymphocyte % 18.8 (L) 19.6 - 45.3 %    Monocyte % 7.1 5.0 - 12.0 %    Eosinophil % 2.2 0.3 - 6.2 %    Basophil % 0.5 0.0 - 1.5 %    Immature Grans % 0.2 0.0 - 0.5 %    Neutrophils, Absolute 4.24 1.70 - 7.00 10*3/mm3    Lymphocytes, Absolute 1.12 0.70 - 3.10 10*3/mm3    Monocytes, Absolute 0.42 0.10 - 0.90 10*3/mm3    Eosinophils, Absolute 0.13 0.00 - 0.40 10*3/mm3    Basophils, Absolute 0.03 0.00 - 0.20 10*3/mm3    Immature Grans, Absolute 0.01 0.00 - 0.05 10*3/mm3    nRBC 0.0 0.0 - 0.2 /100 WBC   Light Blue Top    Collection Time: 01/17/20  4:01 PM   Result Value Ref Range    Extra Tube hold for add-on    Green Top (Gel)    Collection Time: 01/17/20  4:01 PM   Result Value Ref Range    Extra Tube Hold for add-ons.    Lavender Top    Collection Time: 01/17/20  4:01 PM   Result Value Ref Range    Extra Tube hold for add-on    Gold Top - SST    Collection Time: 01/17/20  4:01 PM   Result Value Ref Range    Extra Tube Hold for add-ons.    Ethanol    Collection Time: 01/17/20   4:01 PM   Result Value Ref Range    Ethanol <10 0 - 10 mg/dL    Ethanol % <0.010 %   Ammonia    Collection Time: 01/17/20  4:01 PM   Result Value Ref Range    Ammonia 33 16 - 60 umol/L   Urinalysis With Microscopic If Indicated (No Culture) - Urine, Clean Catch    Collection Time: 01/17/20  5:59 PM   Result Value Ref Range    Color, UA Dark Yellow (A) Yellow, Straw    Appearance, UA Clear Clear    pH, UA 5.5 5.0 - 8.0    Specific Gravity, UA 1.026 1.005 - 1.030    Glucose, UA Negative Negative    Ketones, UA Trace (A) Negative    Bilirubin, UA Negative Negative    Blood, UA Trace (A) Negative    Protein, UA Negative Negative    Leuk Esterase, UA Trace (A) Negative    Nitrite, UA Negative Negative    Urobilinogen, UA 1.0 E.U./dL 0.2 - 1.0 E.U./dL   Urinalysis, Microscopic Only - Urine, Clean Catch    Collection Time: 01/17/20  5:59 PM   Result Value Ref Range    RBC, UA 0-2 None Seen, 0-2 /HPF    WBC, UA 6-12 (A) None Seen, 0-2 /HPF    Bacteria, UA None Seen None Seen /HPF    Squamous Epithelial Cells, UA 0-2 None Seen, 0-2 /HPF    Hyaline Casts, UA 3-6 None Seen /LPF    Methodology Manual Light Microscopy        Ordered the above labs and independently reviewed the results.        RADIOLOGY  No Radiology Exams Resulted Within Past 24 Hours    I ordered the above noted radiological studies. Independently reviewed by me and discussed with radiologist.  See dictation above for official radiology interpretation.      PROCEDURES    Procedures        MEDICATIONS GIVEN IN ER    Medications   sodium chloride 0.9 % flush 10 mL (has no administration in time range)         PROGRESS, DATA ANALYSIS, CONSULTS, AND MEDICAL DECISION MAKING    All labs have been independently reviewed by me.  All radiology studies have been reviewed by me.   EKG's independently reviewed by me.  Discussion below represents my analysis of pertinent findings related to patient's condition, differential diagnosis, treatment plan and final  disposition.      ED Course as of Jan 17 1902 Fri Jan 17, 2020   1648 Potassium(!): 5.4 [JS]   1649 Creatinine(!): 2.35 [JS]   1658 Discussed with Dr Venegas. Call placed to nephrology.     [JS]   1658 Updated pt on labs. EKG in process. Plan for consultation with specialty.     [JS]   1725 Spoke with Dr Calderon with nephrology. Requests pt to stop spirinolactone over weekend and resume on Monday at 25mg. Resume normal lasix dosing on Sunday and repeat labs next week to ensure Cr is continuing to normalize. Pt can be discharged if clinically he appears well and there are no other indications for admission.     [JS]   1857 UA unremarkable for infection will discharge pt with instructions advised by nephrology. He is agreeable. All questions answered.    [JS]      ED Course User Index  [JS] Annamarie Smith APRN       AS OF 7:02 PM VITALS:    BP - 102/75  HR - 84  TEMP - 97.7 °F (36.5 °C) (Tympanic)  02 SATS - 98%        DIAGNOSIS  Final diagnoses:   Acute nontraumatic kidney injury (CMS/HCC)   Alcoholic cirrhosis of liver with ascites (CMS/HCC)         DISPOSITION  Discharge        Annamarie Smith APRN  01/17/20 1902

## 2020-01-17 NOTE — ED PROVIDER NOTES
"Pt is a 44 y.o. male who presents to the ED with hx of alcoholic cirrhosis complaining of constant fatigue that started one week ago. Pt also c/o dysuria, urinary frequency, nausea and \"watery\" malodorous diarrhea. Pt denies hematochezia and melenic stool.    On exam,  Constitutional: NAD  HENT: normocephalic  Cardiovascular: HRRR without murmur  Pulmonary: CTAB  Abdomen: normal active bowels sounds, mild ascites   Musculoskeletal: no pedal edema  Neurological: one beat of asterixis    EKG          EKG time: 1653  Rhythm/Rate: SR, 74  P waves and NV: normal  QRS, axis: normal   ST and T waves: normal     Interpreted Contemporaneously by me, independently viewed  Unchanged compared to prior 12/31/18      Labs and imaging reviewed.     Plan: Discussed w/pt plan is to obtain UA results and consult nephrology. Pt understands and agrees with the plan. All questions were answered.      MD ATTESTATION NOTE    The KEZIA and I have discussed this patient's history, physical exam, and treatment plan.  I have reviewed the documentation and personally had a face to face interaction with the patient. I affirm the documentation and agree with the treatment and plan.  The attached note describes my personal findings.      Documentation assistance provided by ira Wyman for Dr. Arnav Venegas. Information recorded by the scribe was done at my direction and has been verified and validated by me.             Garo Abreu  01/17/20 1712       Mario Venegas MD  01/17/20 1916    "

## 2020-01-18 NOTE — DISCHARGE INSTRUCTIONS
Do not take spirinolactone this weekend. Restart it on Monday but only at 1/2 the normal dose. Take 1/2 tablet (25mg) until your doctor advises you differently.  Do no take Lasix until Sunday.   Follow up with your doctor next week for repeat blood work to ensure that your labs are normalizing as expected  Home to rest  Return for shortness of breath, weakness, dizziness, chest pain, inability to urinate or any new concerns.  Continue care with your primary care physician and have your blood pressure regularly checked and managed. Normal blood pressure is 120/80.